# Patient Record
Sex: FEMALE | Race: WHITE | Employment: OTHER | URBAN - METROPOLITAN AREA
[De-identification: names, ages, dates, MRNs, and addresses within clinical notes are randomized per-mention and may not be internally consistent; named-entity substitution may affect disease eponyms.]

---

## 2018-04-12 ENCOUNTER — OFFICE VISIT (OUTPATIENT)
Dept: ORTHOPEDIC SURGERY | Age: 83
End: 2018-04-12

## 2018-04-12 VITALS — WEIGHT: 110 LBS | BODY MASS INDEX: 17.68 KG/M2 | HEIGHT: 66 IN

## 2018-04-12 DIAGNOSIS — S62.647A CLOSED NONDISPLACED FRACTURE OF PROXIMAL PHALANX OF LEFT LITTLE FINGER, INITIAL ENCOUNTER: Primary | ICD-10-CM

## 2018-04-12 PROCEDURE — 99203 OFFICE O/P NEW LOW 30 MIN: CPT | Performed by: ORTHOPAEDIC SURGERY

## 2018-04-12 PROCEDURE — 1123F ACP DISCUSS/DSCN MKR DOCD: CPT | Performed by: ORTHOPAEDIC SURGERY

## 2018-04-12 PROCEDURE — 1036F TOBACCO NON-USER: CPT | Performed by: ORTHOPAEDIC SURGERY

## 2018-04-12 PROCEDURE — G8400 PT W/DXA NO RESULTS DOC: HCPCS | Performed by: ORTHOPAEDIC SURGERY

## 2018-04-12 PROCEDURE — 1090F PRES/ABSN URINE INCON ASSESS: CPT | Performed by: ORTHOPAEDIC SURGERY

## 2018-04-12 PROCEDURE — 4040F PNEUMOC VAC/ADMIN/RCVD: CPT | Performed by: ORTHOPAEDIC SURGERY

## 2018-04-12 PROCEDURE — G8427 DOCREV CUR MEDS BY ELIG CLIN: HCPCS | Performed by: ORTHOPAEDIC SURGERY

## 2018-04-12 PROCEDURE — G8419 CALC BMI OUT NRM PARAM NOF/U: HCPCS | Performed by: ORTHOPAEDIC SURGERY

## 2018-04-20 ENCOUNTER — TELEPHONE (OUTPATIENT)
Dept: ORTHOPEDIC SURGERY | Age: 83
End: 2018-04-20

## 2020-09-12 ENCOUNTER — APPOINTMENT (OUTPATIENT)
Dept: GENERAL RADIOLOGY | Age: 85
DRG: 470 | End: 2020-09-12
Payer: MEDICARE

## 2020-09-12 ENCOUNTER — HOSPITAL ENCOUNTER (INPATIENT)
Age: 85
LOS: 3 days | Discharge: NURSING, REHAB OR LONG-TERM CARE | DRG: 470 | End: 2020-09-15
Attending: EMERGENCY MEDICINE | Admitting: INTERNAL MEDICINE
Payer: MEDICARE

## 2020-09-12 ENCOUNTER — APPOINTMENT (OUTPATIENT)
Dept: CT IMAGING | Age: 85
DRG: 470 | End: 2020-09-12
Payer: MEDICARE

## 2020-09-12 PROBLEM — S72.8X2A OTHER FRACTURE OF LEFT FEMUR, INITIAL ENCOUNTER FOR CLOSED FRACTURE (HCC): Status: ACTIVE | Noted: 2020-09-12

## 2020-09-12 LAB
ABO/RH: NORMAL
ANION GAP SERPL CALCULATED.3IONS-SCNC: 12 MMOL/L (ref 3–16)
ANTIBODY SCREEN: NORMAL
APTT: 30 SEC (ref 24.2–36.2)
BACTERIA: ABNORMAL /HPF
BASOPHILS ABSOLUTE: 0.1 K/UL (ref 0–0.2)
BASOPHILS RELATIVE PERCENT: 0.6 %
BILIRUBIN URINE: NEGATIVE
BLOOD, URINE: NEGATIVE
BUN BLDV-MCNC: 15 MG/DL (ref 7–20)
CALCIUM SERPL-MCNC: 8.8 MG/DL (ref 8.3–10.6)
CHLORIDE BLD-SCNC: 103 MMOL/L (ref 99–110)
CLARITY: ABNORMAL
CO2: 24 MMOL/L (ref 21–32)
COLOR: YELLOW
CREAT SERPL-MCNC: 1.1 MG/DL (ref 0.6–1.2)
EOSINOPHILS ABSOLUTE: 0 K/UL (ref 0–0.6)
EOSINOPHILS RELATIVE PERCENT: 0.3 %
GFR AFRICAN AMERICAN: 57
GFR NON-AFRICAN AMERICAN: 47
GLUCOSE BLD-MCNC: 124 MG/DL (ref 70–99)
GLUCOSE URINE: NEGATIVE MG/DL
HCT VFR BLD CALC: 38.2 % (ref 36–48)
HEMOGLOBIN: 12.4 G/DL (ref 12–16)
INR BLD: 1.03 (ref 0.86–1.14)
KETONES, URINE: ABNORMAL MG/DL
LEUKOCYTE ESTERASE, URINE: NEGATIVE
LYMPHOCYTES ABSOLUTE: 1 K/UL (ref 1–5.1)
LYMPHOCYTES RELATIVE PERCENT: 7.8 %
MCH RBC QN AUTO: 28.6 PG (ref 26–34)
MCHC RBC AUTO-ENTMCNC: 32.6 G/DL (ref 31–36)
MCV RBC AUTO: 87.8 FL (ref 80–100)
MICROSCOPIC EXAMINATION: YES
MONOCYTES ABSOLUTE: 0.8 K/UL (ref 0–1.3)
MONOCYTES RELATIVE PERCENT: 6.6 %
NEUTROPHILS ABSOLUTE: 10.8 K/UL (ref 1.7–7.7)
NEUTROPHILS RELATIVE PERCENT: 84.7 %
NITRITE, URINE: POSITIVE
PDW BLD-RTO: 14.2 % (ref 12.4–15.4)
PH UA: 8 (ref 5–8)
PLATELET # BLD: 446 K/UL (ref 135–450)
PMV BLD AUTO: 7.4 FL (ref 5–10.5)
POTASSIUM REFLEX MAGNESIUM: 4 MMOL/L (ref 3.5–5.1)
PROTEIN UA: NEGATIVE MG/DL
PROTHROMBIN TIME: 11.9 SEC (ref 10–13.2)
RBC # BLD: 4.34 M/UL (ref 4–5.2)
RBC UA: ABNORMAL /HPF (ref 0–4)
SARS-COV-2, NAAT: NOT DETECTED
SODIUM BLD-SCNC: 139 MMOL/L (ref 136–145)
SPECIFIC GRAVITY UA: 1.02 (ref 1–1.03)
URINE TYPE: ABNORMAL
UROBILINOGEN, URINE: 1 E.U./DL
WBC # BLD: 12.8 K/UL (ref 4–11)
WBC UA: ABNORMAL /HPF (ref 0–5)

## 2020-09-12 PROCEDURE — 85610 PROTHROMBIN TIME: CPT

## 2020-09-12 PROCEDURE — U0003 INFECTIOUS AGENT DETECTION BY NUCLEIC ACID (DNA OR RNA); SEVERE ACUTE RESPIRATORY SYNDROME CORONAVIRUS 2 (SARS-COV-2) (CORONAVIRUS DISEASE [COVID-19]), AMPLIFIED PROBE TECHNIQUE, MAKING USE OF HIGH THROUGHPUT TECHNOLOGIES AS DESCRIBED BY CMS-2020-01-R: HCPCS

## 2020-09-12 PROCEDURE — 71045 X-RAY EXAM CHEST 1 VIEW: CPT

## 2020-09-12 PROCEDURE — 6360000002 HC RX W HCPCS: Performed by: STUDENT IN AN ORGANIZED HEALTH CARE EDUCATION/TRAINING PROGRAM

## 2020-09-12 PROCEDURE — 99285 EMERGENCY DEPT VISIT HI MDM: CPT

## 2020-09-12 PROCEDURE — 2580000003 HC RX 258: Performed by: STUDENT IN AN ORGANIZED HEALTH CARE EDUCATION/TRAINING PROGRAM

## 2020-09-12 PROCEDURE — 72170 X-RAY EXAM OF PELVIS: CPT

## 2020-09-12 PROCEDURE — 81001 URINALYSIS AUTO W/SCOPE: CPT

## 2020-09-12 PROCEDURE — 73560 X-RAY EXAM OF KNEE 1 OR 2: CPT

## 2020-09-12 PROCEDURE — U0002 COVID-19 LAB TEST NON-CDC: HCPCS

## 2020-09-12 PROCEDURE — 80048 BASIC METABOLIC PNL TOTAL CA: CPT

## 2020-09-12 PROCEDURE — 70450 CT HEAD/BRAIN W/O DYE: CPT

## 2020-09-12 PROCEDURE — 96365 THER/PROPH/DIAG IV INF INIT: CPT

## 2020-09-12 PROCEDURE — 86900 BLOOD TYPING SEROLOGIC ABO: CPT

## 2020-09-12 PROCEDURE — 36415 COLL VENOUS BLD VENIPUNCTURE: CPT

## 2020-09-12 PROCEDURE — 86901 BLOOD TYPING SEROLOGIC RH(D): CPT

## 2020-09-12 PROCEDURE — 85025 COMPLETE CBC W/AUTO DIFF WBC: CPT

## 2020-09-12 PROCEDURE — 86850 RBC ANTIBODY SCREEN: CPT

## 2020-09-12 PROCEDURE — 1200000000 HC SEMI PRIVATE

## 2020-09-12 PROCEDURE — 85730 THROMBOPLASTIN TIME PARTIAL: CPT

## 2020-09-12 RX ORDER — PROMETHAZINE HYDROCHLORIDE 25 MG/1
12.5 TABLET ORAL EVERY 6 HOURS PRN
Status: DISCONTINUED | OUTPATIENT
Start: 2020-09-12 | End: 2020-09-15 | Stop reason: HOSPADM

## 2020-09-12 RX ORDER — LEVOTHYROXINE SODIUM 0.07 MG/1
75 TABLET ORAL DAILY
Status: DISCONTINUED | OUTPATIENT
Start: 2020-09-13 | End: 2020-09-15 | Stop reason: HOSPADM

## 2020-09-12 RX ORDER — PSYLLIUM HUSK 0.4 G
1000 CAPSULE ORAL DAILY
COMMUNITY

## 2020-09-12 RX ORDER — SODIUM CHLORIDE 0.9 % (FLUSH) 0.9 %
10 SYRINGE (ML) INJECTION PRN
Status: DISCONTINUED | OUTPATIENT
Start: 2020-09-12 | End: 2020-09-15 | Stop reason: HOSPADM

## 2020-09-12 RX ORDER — POLYETHYLENE GLYCOL 3350 17 G/17G
17 POWDER, FOR SOLUTION ORAL DAILY PRN
Status: DISCONTINUED | OUTPATIENT
Start: 2020-09-12 | End: 2020-09-15 | Stop reason: HOSPADM

## 2020-09-12 RX ORDER — MORPHINE SULFATE 2 MG/ML
2 INJECTION, SOLUTION INTRAMUSCULAR; INTRAVENOUS
Status: DISCONTINUED | OUTPATIENT
Start: 2020-09-12 | End: 2020-09-15 | Stop reason: HOSPADM

## 2020-09-12 RX ORDER — MEMANTINE HYDROCHLORIDE 5 MG/1
10 TABLET ORAL 2 TIMES DAILY
Status: DISCONTINUED | OUTPATIENT
Start: 2020-09-13 | End: 2020-09-15 | Stop reason: HOSPADM

## 2020-09-12 RX ORDER — LANOLIN ALCOHOL/MO/W.PET/CERES
3 CREAM (GRAM) TOPICAL NIGHTLY PRN
Status: DISCONTINUED | OUTPATIENT
Start: 2020-09-12 | End: 2020-09-15 | Stop reason: HOSPADM

## 2020-09-12 RX ORDER — DONEPEZIL HYDROCHLORIDE 10 MG/1
10 TABLET, FILM COATED ORAL NIGHTLY
Status: DISCONTINUED | OUTPATIENT
Start: 2020-09-13 | End: 2020-09-15 | Stop reason: HOSPADM

## 2020-09-12 RX ORDER — ACETAMINOPHEN 160 MG/5ML
650 SOLUTION ORAL EVERY 6 HOURS
Status: DISCONTINUED | OUTPATIENT
Start: 2020-09-12 | End: 2020-09-15 | Stop reason: HOSPADM

## 2020-09-12 RX ORDER — ONDANSETRON 2 MG/ML
4 INJECTION INTRAMUSCULAR; INTRAVENOUS EVERY 6 HOURS PRN
Status: DISCONTINUED | OUTPATIENT
Start: 2020-09-12 | End: 2020-09-15 | Stop reason: HOSPADM

## 2020-09-12 RX ORDER — SODIUM CHLORIDE 0.9 % (FLUSH) 0.9 %
10 SYRINGE (ML) INJECTION EVERY 12 HOURS SCHEDULED
Status: DISCONTINUED | OUTPATIENT
Start: 2020-09-12 | End: 2020-09-15 | Stop reason: HOSPADM

## 2020-09-12 RX ORDER — CLINDAMYCIN PHOSPHATE 900 MG/50ML
900 INJECTION INTRAVENOUS
Status: COMPLETED | OUTPATIENT
Start: 2020-09-13 | End: 2020-09-13

## 2020-09-12 RX ORDER — ESCITALOPRAM OXALATE 5 MG/1
5 TABLET ORAL DAILY
Status: DISCONTINUED | OUTPATIENT
Start: 2020-09-13 | End: 2020-09-15 | Stop reason: HOSPADM

## 2020-09-12 RX ORDER — TRAMADOL HYDROCHLORIDE 50 MG/1
25 TABLET ORAL EVERY 6 HOURS PRN
Status: DISCONTINUED | OUTPATIENT
Start: 2020-09-12 | End: 2020-09-15 | Stop reason: HOSPADM

## 2020-09-12 RX ADMIN — CEFTRIAXONE 1 G: 1 INJECTION, POWDER, FOR SOLUTION INTRAMUSCULAR; INTRAVENOUS at 22:07

## 2020-09-12 ASSESSMENT — ENCOUNTER SYMPTOMS
SORE THROAT: 0
WHEEZING: 0
NAUSEA: 0
EYE PAIN: 0
PHOTOPHOBIA: 0
ABDOMINAL PAIN: 0
VOMITING: 0
SHORTNESS OF BREATH: 0

## 2020-09-12 ASSESSMENT — PAIN SCALES - PAIN ASSESSMENT IN ADVANCED DEMENTIA (PAINAD)
CONSOLABILITY: 1
BODYLANGUAGE: 0
BREATHING: 1
TOTALSCORE: 3
NEGVOCALIZATION: 1
FACIALEXPRESSION: 0

## 2020-09-12 ASSESSMENT — PAIN SCALES - WONG BAKER: WONGBAKER_NUMERICALRESPONSE: 6

## 2020-09-12 NOTE — ED PROVIDER NOTES
ED Attending Attestation Note     Date of evaluation: 9/12/2020    This patient was seen by the resident. I have seen and examined the patient, agree with the workup, evaluation, management and diagnosis. The care plan has been discussed. I have reviewed the ECG and concur with the resident's interpretation. My assessment reveals patient with history of dementia presents after witnessed fall with findings of a hip fracture. Patient's left leg is foreshortened and externally rotated.      Gordy Cosby MD  09/12/20 7814

## 2020-09-12 NOTE — ED PROVIDER NOTES
[address] ENCOUNTER          EM RESIDENT NOTE       Date of evaluation: 9/12/2020    Chief Complaint     Hip Injury (left hip fracture per [facility] from fall this AM)      History of Present Illness     Nilay Daniels is a 80 y.o. female who presents with a chief complaint of Hip Injury (left hip fracture per E[facility] from fall this AM)    Past medical history notable for: dementia, hypertension, hypothyroidism. Previous fall in 2018 at which time the patient had a humeral fracture    The patient presents from nursing home. Per EMS report, the patient had a witnessed non-syncopal fall today approximately 12 hours prior to presentation. There was pain in her left hip. Per the report, the nursing home obtained x-rays of the left hip and there was a fracture. The patient has received Tylenol for pain control. On arrival, the patient is conversant. She states that she has pain in her left hip. She states that she did not hit her head. She did not lose consciousness. XR read from facility indicates left femoral neck fracture. Not on anticoagulation. Dr. Merly Gooden was called as he is a friend of the family. He is aware of the patient. He was spoken to on the phone. He states that if there are no other findings, he would like the patient admitted to the hospital and will see her in the morning. Other than stated above, no additional aggravating or alleviating factors are noted. All nursing notes and triage notes were appropriately reviewed in the course of the creation of this note. Past Medical, Surgical, Family, and Social History     She has a past medical history of Anemia, Dementia ([address] Hypertension, and Thyroid disease. She has a past surgical history that includes Hysterectomy and Eye surgery. Her family history is not on file. She reports that she has never smoked. She has never used smokeless tobacco. She reports current alcohol use.  She reports that she does not use drugs. Review of Systems     Review of Systems   Constitutional: Negative for chills and fever. HENT: Negative for ear pain and sore throat. Eyes: Negative for photophobia and pain. Respiratory: Negative for shortness of breath and wheezing. Cardiovascular: Negative for chest pain. Gastrointestinal: Negative for abdominal pain, nausea and vomiting. Genitourinary: Negative for difficulty urinating, dysuria, flank pain, hematuria and menstrual problem. Musculoskeletal: Positive for gait problem. Left hip pain   Skin: Negative for rash. Neurological: Negative for syncope and weakness. Psychiatric/Behavioral: Negative. Medications     Previous Medications    ACETAMINOPHEN (TYLENOL) 325 MG TABLET    Take 650 mg by mouth 3 times daily as needed for Pain    ASPIRIN 325 MG EC TABLET    Take 325 mg by mouth daily. BISACODYL (DULCOLAX) 10 MG SUPPOSITORY    Place 10 mg rectally daily    DONEPEZIL (ARICEPT) 5 MG TABLET    Take 10 mg by mouth nightly     ESCITALOPRAM (LEXAPRO) 5 MG TABLET    Take 5 mg by mouth daily. GUAIFENESIN-DEXTROMETHORPHAN (ROBITUSSIN DM) 100-10 MG/5ML SYRUP    Take 10 mLs by mouth 4 times daily as needed for Cough    LEVOTHYROXINE (SYNTHROID) 75 MCG TABLET    Take 75 mcg by mouth daily     LORATADINE (CLARITIN) 10 MG CAPSULE    Take 10 mg by mouth daily    MAGNESIUM HYDROXIDE (MILK OF MAGNESIA) 400 MG/5ML SUSPENSION    Take 30 mLs by mouth 3 times daily as needed for Constipation    MELATONIN 3 MG TABS TABLET    Take 6 mg by mouth nightly as needed    MEMANTINE ER (NAMENDA XR) 28 MG CP24 EXTENDED RELEASE CAPSULE    Take by mouth daily    VITAMIN D (ERGOCALCIFEROL) 60231 UNITS CAPS CAPSULE    Take 50,000 Units by mouth daily        Allergies     She is allergic to pcn [penicillins] and sulfa antibiotics.     Physical Exam     INITIAL VITALS:   BP: (!) 156/59, Temp: 97.5 °F (36.4 °C), Pulse: 85, Resp: 14, SpO2: 92 %     Physical Exam  Vitals signs and nursing note reviewed. Constitutional:       General: She is not in acute distress. HENT:      Head: Normocephalic and atraumatic. Right Ear: External ear normal.      Left Ear: External ear normal.      Nose: Nose normal.      Mouth/Throat:      Mouth: Mucous membranes are moist.   Eyes:      Extraocular Movements: Extraocular movements intact. Pupils: Pupils are equal, round, and reactive to light. Neck:      Musculoskeletal: Normal range of motion and neck supple. No neck rigidity. Cardiovascular:      Rate and Rhythm: Regular rhythm. Heart sounds: Normal heart sounds. No murmur. No friction rub. No gallop. Pulmonary:      Effort: Pulmonary effort is normal. No respiratory distress. Breath sounds: Normal breath sounds. No wheezing or rhonchi. Abdominal:      Palpations: Abdomen is soft. Tenderness: There is no abdominal tenderness. There is no right CVA tenderness, left CVA tenderness, guarding or rebound. Musculoskeletal:         General: Tenderness and signs of injury present. No deformity. Comments: The patient has appropriate strength and range of motion in her right lower extremity. The left lower extremity the patient is unable to lift off the bed due to pain. The LLE is shortened and externally rotated compared to the right. She is additionally unable to flex her left knee due to pain. Intact to sensation distally. Skin:     General: Skin is warm and dry. Findings: No rash. Comments: There is a small skin tear over the left elbow. There is an area of ecchymosis over the left anterior knee. Neurological:      Mental Status: She is alert. Mental status is at baseline. She is confused. Cranial Nerves: No cranial nerve deficit. Sensory: No sensory deficit.    Psychiatric:         Mood and Affect: Mood normal.         Behavior: Behavior normal.          Diagnostic Results     LABS AND RADIOLOGY  Please see electronic medical record for any tests [AF]   2034 No acute fracture or dislocation. Severe degenerative narrowing of medial femoral tibial compartment. No significant joint effusion. XR KNEE LEFT (1-2 VIEWS) [AF]   2035 Heart is upper normal limits in size. No focal consolidation. No pleural effusion. No pneumothorax. Previous ORIF of the right humerus. XR CHEST PORTABLE [AF]   2047 Suspect likely leukocytosis from stress response to injury. No concern for infection   WBC(!): 12.8 [AF]   2122 stable   Creatinine: 1.1 [AF]      ED Course User Index  [AF] Kevin Chavez MD     No evidence of hemorrhage, fracture on CT head. Radiography demonstrates left femoral neck fracture. No evidence of fracture of the left knee. We spoke with the patient's orthopedic surgeon who would like the patient admitted and plans to see her tomorrow. Preop labs obtained. The patient will be admitted. I spoke on the phone with hospitalist who has agreed to admit the patient. Incidental finding of urine with 4+ bacteriuria as well as nitrites. Patient is asymptomatic at this time but this will be treated as the patient will be going to the OR tomorrow. Plan: At this time, the patient has been admitted to medicine for further evaluation and management of hip fracture by ortho. The patient will continue to be monitored here in the emergency department until which time she is moved to her new treatment location. Workup, treatment, and diagnosis were discussed with the patient and/or family members; the patient agrees to the plan and all questions were addressed and answered. Krystyna Regan MD, PGY-1   Emergency Medicine    Clinical Impression     1. Cl[facility] of [facility] of left femur[facility]        Disposition     PATIENT REFERRED TO:  No follow-up provider specified.     DISCHARGE MEDICATIONS:  New Prescriptions    No medications on file       Jayesh Tse MD  Resident  09/12/20 7268

## 2020-09-13 ENCOUNTER — ANESTHESIA EVENT (OUTPATIENT)
Dept: OPERATING ROOM | Age: 85
DRG: 470 | End: 2020-09-13
Payer: MEDICARE

## 2020-09-13 ENCOUNTER — ANESTHESIA (OUTPATIENT)
Dept: OPERATING ROOM | Age: 85
DRG: 470 | End: 2020-09-13
Payer: MEDICARE

## 2020-09-13 ENCOUNTER — APPOINTMENT (OUTPATIENT)
Dept: GENERAL RADIOLOGY | Age: 85
DRG: 470 | End: 2020-09-13
Payer: MEDICARE

## 2020-09-13 VITALS — OXYGEN SATURATION: 100 % | SYSTOLIC BLOOD PRESSURE: 96 MMHG | DIASTOLIC BLOOD PRESSURE: 48 MMHG | TEMPERATURE: 94.6 F

## 2020-09-13 PROCEDURE — 6360000002 HC RX W HCPCS: Performed by: ORTHOPAEDIC SURGERY

## 2020-09-13 PROCEDURE — 99222 1ST HOSP IP/OBS MODERATE 55: CPT | Performed by: ORTHOPAEDIC SURGERY

## 2020-09-13 PROCEDURE — 6360000002 HC RX W HCPCS: Performed by: STUDENT IN AN ORGANIZED HEALTH CARE EDUCATION/TRAINING PROGRAM

## 2020-09-13 PROCEDURE — 6370000000 HC RX 637 (ALT 250 FOR IP): Performed by: PEDIATRICS

## 2020-09-13 PROCEDURE — 73502 X-RAY EXAM HIP UNI 2-3 VIEWS: CPT

## 2020-09-13 PROCEDURE — 2580000003 HC RX 258: Performed by: STUDENT IN AN ORGANIZED HEALTH CARE EDUCATION/TRAINING PROGRAM

## 2020-09-13 PROCEDURE — 27236 TREAT THIGH FRACTURE: CPT | Performed by: ORTHOPAEDIC SURGERY

## 2020-09-13 PROCEDURE — 2500000003 HC RX 250 WO HCPCS: Performed by: ORTHOPAEDIC SURGERY

## 2020-09-13 PROCEDURE — 0SRS0JA REPLACEMENT OF LEFT HIP JOINT, FEMORAL SURFACE WITH SYNTHETIC SUBSTITUTE, UNCEMENTED, OPEN APPROACH: ICD-10-PCS | Performed by: ORTHOPAEDIC SURGERY

## 2020-09-13 PROCEDURE — 2580000003 HC RX 258: Performed by: ANESTHESIOLOGY

## 2020-09-13 PROCEDURE — 2580000003 HC RX 258: Performed by: ORTHOPAEDIC SURGERY

## 2020-09-13 PROCEDURE — 2500000003 HC RX 250 WO HCPCS: Performed by: ANESTHESIOLOGY

## 2020-09-13 PROCEDURE — 3600000014 HC SURGERY LEVEL 4 ADDTL 15MIN: Performed by: ORTHOPAEDIC SURGERY

## 2020-09-13 PROCEDURE — 3700000000 HC ANESTHESIA ATTENDED CARE: Performed by: ORTHOPAEDIC SURGERY

## 2020-09-13 PROCEDURE — 6370000000 HC RX 637 (ALT 250 FOR IP): Performed by: ORTHOPAEDIC SURGERY

## 2020-09-13 PROCEDURE — 6360000002 HC RX W HCPCS: Performed by: PEDIATRICS

## 2020-09-13 PROCEDURE — 1200000000 HC SEMI PRIVATE

## 2020-09-13 PROCEDURE — 94150 VITAL CAPACITY TEST: CPT

## 2020-09-13 PROCEDURE — 2709999900 HC NON-CHARGEABLE SUPPLY: Performed by: ORTHOPAEDIC SURGERY

## 2020-09-13 PROCEDURE — 88311 DECALCIFY TISSUE: CPT

## 2020-09-13 PROCEDURE — 88305 TISSUE EXAM BY PATHOLOGIST: CPT

## 2020-09-13 PROCEDURE — 7100000001 HC PACU RECOVERY - ADDTL 15 MIN: Performed by: ORTHOPAEDIC SURGERY

## 2020-09-13 PROCEDURE — C1776 JOINT DEVICE (IMPLANTABLE): HCPCS | Performed by: ORTHOPAEDIC SURGERY

## 2020-09-13 PROCEDURE — 7100000000 HC PACU RECOVERY - FIRST 15 MIN: Performed by: ORTHOPAEDIC SURGERY

## 2020-09-13 PROCEDURE — 3700000001 HC ADD 15 MINUTES (ANESTHESIA): Performed by: ORTHOPAEDIC SURGERY

## 2020-09-13 PROCEDURE — 3600000004 HC SURGERY LEVEL 4 BASE: Performed by: ORTHOPAEDIC SURGERY

## 2020-09-13 PROCEDURE — 2580000003 HC RX 258: Performed by: PEDIATRICS

## 2020-09-13 PROCEDURE — 6360000002 HC RX W HCPCS: Performed by: ANESTHESIOLOGY

## 2020-09-13 DEVICE — STEM FEM SZ 5 L108MM NK L35MM 44MM OFFSET 127DEG HIP TI: Type: IMPLANTABLE DEVICE | Site: HIP | Status: FUNCTIONAL

## 2020-09-13 DEVICE — IMPL HIP FEM HEAD TAPR VITALLIUM 4MM: Type: IMPLANTABLE DEVICE | Site: HIP | Status: FUNCTIONAL

## 2020-09-13 DEVICE — COMPONENT TOT HIP CAPPED BPLR UPLR CEM STEM H4STRYKER] STRYKER CORP]: Type: IMPLANTABLE DEVICE | Site: HIP | Status: FUNCTIONAL

## 2020-09-13 RX ORDER — SODIUM CHLORIDE 450 MG/100ML
INJECTION, SOLUTION INTRAVENOUS CONTINUOUS
Status: DISCONTINUED | OUTPATIENT
Start: 2020-09-13 | End: 2020-09-15 | Stop reason: HOSPADM

## 2020-09-13 RX ORDER — BUPIVACAINE HYDROCHLORIDE 5 MG/ML
INJECTION, SOLUTION EPIDURAL; INTRACAUDAL PRN
Status: DISCONTINUED | OUTPATIENT
Start: 2020-09-13 | End: 2020-09-13 | Stop reason: ALTCHOICE

## 2020-09-13 RX ORDER — PROMETHAZINE HYDROCHLORIDE 25 MG/ML
6.25 INJECTION, SOLUTION INTRAMUSCULAR; INTRAVENOUS
Status: DISCONTINUED | OUTPATIENT
Start: 2020-09-13 | End: 2020-09-13 | Stop reason: HOSPADM

## 2020-09-13 RX ORDER — GLYCOPYRROLATE 1 MG/5 ML
SYRINGE (ML) INTRAVENOUS PRN
Status: DISCONTINUED | OUTPATIENT
Start: 2020-09-13 | End: 2020-09-13 | Stop reason: SDUPTHER

## 2020-09-13 RX ORDER — ROCURONIUM BROMIDE 10 MG/ML
INJECTION, SOLUTION INTRAVENOUS PRN
Status: DISCONTINUED | OUTPATIENT
Start: 2020-09-13 | End: 2020-09-13 | Stop reason: SDUPTHER

## 2020-09-13 RX ORDER — HYDRALAZINE HYDROCHLORIDE 20 MG/ML
5 INJECTION INTRAMUSCULAR; INTRAVENOUS EVERY 10 MIN PRN
Status: DISCONTINUED | OUTPATIENT
Start: 2020-09-13 | End: 2020-09-13 | Stop reason: HOSPADM

## 2020-09-13 RX ORDER — MAGNESIUM HYDROXIDE 1200 MG/15ML
LIQUID ORAL CONTINUOUS PRN
Status: COMPLETED | OUTPATIENT
Start: 2020-09-13 | End: 2020-09-13

## 2020-09-13 RX ORDER — SODIUM CHLORIDE 0.9 % (FLUSH) 0.9 %
10 SYRINGE (ML) INJECTION EVERY 12 HOURS SCHEDULED
Status: DISCONTINUED | OUTPATIENT
Start: 2020-09-13 | End: 2020-09-15 | Stop reason: HOSPADM

## 2020-09-13 RX ORDER — ONDANSETRON 2 MG/ML
4 INJECTION INTRAMUSCULAR; INTRAVENOUS
Status: DISCONTINUED | OUTPATIENT
Start: 2020-09-13 | End: 2020-09-13 | Stop reason: HOSPADM

## 2020-09-13 RX ORDER — ONDANSETRON 2 MG/ML
INJECTION INTRAMUSCULAR; INTRAVENOUS PRN
Status: DISCONTINUED | OUTPATIENT
Start: 2020-09-13 | End: 2020-09-13 | Stop reason: SDUPTHER

## 2020-09-13 RX ORDER — LABETALOL 20 MG/4 ML (5 MG/ML) INTRAVENOUS SYRINGE
5 EVERY 10 MIN PRN
Status: DISCONTINUED | OUTPATIENT
Start: 2020-09-13 | End: 2020-09-13 | Stop reason: HOSPADM

## 2020-09-13 RX ORDER — FENTANYL CITRATE 50 UG/ML
INJECTION, SOLUTION INTRAMUSCULAR; INTRAVENOUS PRN
Status: DISCONTINUED | OUTPATIENT
Start: 2020-09-13 | End: 2020-09-13 | Stop reason: SDUPTHER

## 2020-09-13 RX ORDER — DEXAMETHASONE SODIUM PHOSPHATE 4 MG/ML
INJECTION, SOLUTION INTRA-ARTICULAR; INTRALESIONAL; INTRAMUSCULAR; INTRAVENOUS; SOFT TISSUE PRN
Status: DISCONTINUED | OUTPATIENT
Start: 2020-09-13 | End: 2020-09-13 | Stop reason: SDUPTHER

## 2020-09-13 RX ORDER — SODIUM CHLORIDE, SODIUM LACTATE, POTASSIUM CHLORIDE, CALCIUM CHLORIDE 600; 310; 30; 20 MG/100ML; MG/100ML; MG/100ML; MG/100ML
INJECTION, SOLUTION INTRAVENOUS CONTINUOUS PRN
Status: DISCONTINUED | OUTPATIENT
Start: 2020-09-13 | End: 2020-09-13 | Stop reason: SDUPTHER

## 2020-09-13 RX ORDER — FENTANYL CITRATE 50 UG/ML
25 INJECTION, SOLUTION INTRAMUSCULAR; INTRAVENOUS EVERY 5 MIN PRN
Status: DISCONTINUED | OUTPATIENT
Start: 2020-09-13 | End: 2020-09-13 | Stop reason: HOSPADM

## 2020-09-13 RX ORDER — PROPOFOL 10 MG/ML
INJECTION, EMULSION INTRAVENOUS PRN
Status: DISCONTINUED | OUTPATIENT
Start: 2020-09-13 | End: 2020-09-13 | Stop reason: SDUPTHER

## 2020-09-13 RX ORDER — EPHEDRINE SULFATE 50 MG/ML
INJECTION, SOLUTION INTRAVENOUS PRN
Status: DISCONTINUED | OUTPATIENT
Start: 2020-09-13 | End: 2020-09-13 | Stop reason: SDUPTHER

## 2020-09-13 RX ORDER — SENNA AND DOCUSATE SODIUM 50; 8.6 MG/1; MG/1
1 TABLET, FILM COATED ORAL 2 TIMES DAILY
Status: DISCONTINUED | OUTPATIENT
Start: 2020-09-13 | End: 2020-09-15 | Stop reason: HOSPADM

## 2020-09-13 RX ORDER — FENTANYL CITRATE 50 UG/ML
50 INJECTION, SOLUTION INTRAMUSCULAR; INTRAVENOUS EVERY 5 MIN PRN
Status: DISCONTINUED | OUTPATIENT
Start: 2020-09-13 | End: 2020-09-13 | Stop reason: HOSPADM

## 2020-09-13 RX ORDER — SODIUM CHLORIDE 9 MG/ML
INJECTION, SOLUTION INTRAVENOUS CONTINUOUS PRN
Status: DISCONTINUED | OUTPATIENT
Start: 2020-09-13 | End: 2020-09-13 | Stop reason: SDUPTHER

## 2020-09-13 RX ORDER — CLINDAMYCIN PHOSPHATE 900 MG/50ML
900 INJECTION INTRAVENOUS EVERY 8 HOURS
Status: COMPLETED | OUTPATIENT
Start: 2020-09-13 | End: 2020-09-14

## 2020-09-13 RX ORDER — OXYCODONE HYDROCHLORIDE AND ACETAMINOPHEN 5; 325 MG/1; MG/1
1 TABLET ORAL
Status: DISCONTINUED | OUTPATIENT
Start: 2020-09-13 | End: 2020-09-13 | Stop reason: HOSPADM

## 2020-09-13 RX ORDER — NEOSTIGMINE METHYLSULFATE 5 MG/5 ML
SYRINGE (ML) INTRAVENOUS PRN
Status: DISCONTINUED | OUTPATIENT
Start: 2020-09-13 | End: 2020-09-13 | Stop reason: SDUPTHER

## 2020-09-13 RX ORDER — SODIUM CHLORIDE 0.9 % (FLUSH) 0.9 %
10 SYRINGE (ML) INJECTION PRN
Status: DISCONTINUED | OUTPATIENT
Start: 2020-09-13 | End: 2020-09-15 | Stop reason: HOSPADM

## 2020-09-13 RX ADMIN — EPHEDRINE SULFATE 5 MG: 50 INJECTION, SOLUTION INTRAMUSCULAR; INTRAVENOUS; SUBCUTANEOUS at 10:08

## 2020-09-13 RX ADMIN — SODIUM CHLORIDE, SODIUM LACTATE, POTASSIUM CHLORIDE, AND CALCIUM CHLORIDE: 600; 310; 30; 20 INJECTION, SOLUTION INTRAVENOUS at 09:04

## 2020-09-13 RX ADMIN — ACETAMINOPHEN 650 MG: 650 SOLUTION ORAL at 23:53

## 2020-09-13 RX ADMIN — CEFAZOLIN SODIUM 1000 MG: 1 INJECTION, POWDER, FOR SOLUTION INTRAMUSCULAR; INTRAVENOUS at 14:47

## 2020-09-13 RX ADMIN — PROPOFOL 50 MG: 10 INJECTION, EMULSION INTRAVENOUS at 08:34

## 2020-09-13 RX ADMIN — CEFTRIAXONE 1 G: 1 INJECTION, POWDER, FOR SOLUTION INTRAMUSCULAR; INTRAVENOUS at 14:47

## 2020-09-13 RX ADMIN — MEMANTINE HYDROCHLORIDE 10 MG: 5 TABLET ORAL at 21:27

## 2020-09-13 RX ADMIN — ACETAMINOPHEN 650 MG: 650 SOLUTION ORAL at 13:05

## 2020-09-13 RX ADMIN — ESCITALOPRAM 5 MG: 5 TABLET, FILM COATED ORAL at 13:05

## 2020-09-13 RX ADMIN — EPHEDRINE SULFATE 5 MG: 50 INJECTION, SOLUTION INTRAMUSCULAR; INTRAVENOUS; SUBCUTANEOUS at 09:42

## 2020-09-13 RX ADMIN — ONDANSETRON 4 MG: 2 INJECTION INTRAMUSCULAR; INTRAVENOUS at 09:07

## 2020-09-13 RX ADMIN — ACETAMINOPHEN 650 MG: 650 SOLUTION ORAL at 00:09

## 2020-09-13 RX ADMIN — CLINDAMYCIN IN 5 PERCENT DEXTROSE 900 MG: 18 INJECTION, SOLUTION INTRAVENOUS at 08:46

## 2020-09-13 RX ADMIN — CEFTRIAXONE 1 G: 1 INJECTION, POWDER, FOR SOLUTION INTRAMUSCULAR; INTRAVENOUS at 08:52

## 2020-09-13 RX ADMIN — CEFTRIAXONE 1 G: 1 INJECTION, POWDER, FOR SOLUTION INTRAMUSCULAR; INTRAVENOUS at 22:00

## 2020-09-13 RX ADMIN — FENTANYL CITRATE 100 MCG: 50 INJECTION INTRAMUSCULAR; INTRAVENOUS at 08:34

## 2020-09-13 RX ADMIN — Medication 3 MG: at 00:08

## 2020-09-13 RX ADMIN — CLINDAMYCIN IN 5 PERCENT DEXTROSE 900 MG: 18 INJECTION, SOLUTION INTRAVENOUS at 18:26

## 2020-09-13 RX ADMIN — FAMOTIDINE 20 MG: 10 INJECTION, SOLUTION INTRAVENOUS at 09:07

## 2020-09-13 RX ADMIN — MEMANTINE HYDROCHLORIDE 10 MG: 5 TABLET ORAL at 02:12

## 2020-09-13 RX ADMIN — SODIUM CHLORIDE: 9 INJECTION, SOLUTION INTRAVENOUS at 08:31

## 2020-09-13 RX ADMIN — LEVOTHYROXINE SODIUM 75 MCG: 0.07 TABLET ORAL at 05:36

## 2020-09-13 RX ADMIN — Medication 10 ML: at 00:00

## 2020-09-13 RX ADMIN — SUGAMMADEX 200 MG: 100 INJECTION, SOLUTION INTRAVENOUS at 10:45

## 2020-09-13 RX ADMIN — ROCURONIUM BROMIDE 20 MG: 10 INJECTION INTRAVENOUS at 09:30

## 2020-09-13 RX ADMIN — Medication 0.6 MG: at 10:25

## 2020-09-13 RX ADMIN — ROCURONIUM BROMIDE 30 MG: 10 INJECTION INTRAVENOUS at 08:34

## 2020-09-13 RX ADMIN — DONEPEZIL HYDROCHLORIDE 10 MG: 10 TABLET, FILM COATED ORAL at 21:28

## 2020-09-13 RX ADMIN — DEXAMETHASONE SODIUM PHOSPHATE 4 MG: 4 INJECTION, SOLUTION INTRAMUSCULAR; INTRAVENOUS at 09:07

## 2020-09-13 RX ADMIN — Medication 10 ML: at 13:07

## 2020-09-13 RX ADMIN — EPHEDRINE SULFATE 5 MG: 50 INJECTION, SOLUTION INTRAMUSCULAR; INTRAVENOUS; SUBCUTANEOUS at 09:18

## 2020-09-13 RX ADMIN — TRAMADOL HYDROCHLORIDE 25 MG: 50 TABLET, FILM COATED ORAL at 00:09

## 2020-09-13 RX ADMIN — ACETAMINOPHEN 650 MG: 650 SOLUTION ORAL at 05:36

## 2020-09-13 RX ADMIN — DOCUSATE SODIUM 50 MG AND SENNOSIDES 8.6 MG 1 TABLET: 8.6; 5 TABLET, FILM COATED ORAL at 21:28

## 2020-09-13 RX ADMIN — Medication 3 MG: at 10:25

## 2020-09-13 RX ADMIN — EPHEDRINE SULFATE 5 MG: 50 INJECTION, SOLUTION INTRAMUSCULAR; INTRAVENOUS; SUBCUTANEOUS at 08:55

## 2020-09-13 RX ADMIN — ACETAMINOPHEN 650 MG: 650 SOLUTION ORAL at 17:59

## 2020-09-13 RX ADMIN — SODIUM CHLORIDE: 450 INJECTION, SOLUTION INTRAVENOUS at 11:21

## 2020-09-13 RX ADMIN — DONEPEZIL HYDROCHLORIDE 10 MG: 10 TABLET, FILM COATED ORAL at 00:08

## 2020-09-13 RX ADMIN — MEMANTINE HYDROCHLORIDE 10 MG: 5 TABLET ORAL at 13:05

## 2020-09-13 RX ADMIN — ENOXAPARIN SODIUM 30 MG: 30 INJECTION SUBCUTANEOUS at 18:24

## 2020-09-13 RX ADMIN — EPHEDRINE SULFATE 5 MG: 50 INJECTION, SOLUTION INTRAMUSCULAR; INTRAVENOUS; SUBCUTANEOUS at 09:22

## 2020-09-13 ASSESSMENT — PULMONARY FUNCTION TESTS
PIF_VALUE: 1
PIF_VALUE: 19
PIF_VALUE: 21
PIF_VALUE: 19
PIF_VALUE: 18
PIF_VALUE: 18
PIF_VALUE: 19
PIF_VALUE: 1
PIF_VALUE: 18
PIF_VALUE: 19
PIF_VALUE: 20
PIF_VALUE: 18
PIF_VALUE: 19
PIF_VALUE: 20
PIF_VALUE: 19
PIF_VALUE: 20
PIF_VALUE: 19
PIF_VALUE: 20
PIF_VALUE: 22
PIF_VALUE: 19
PIF_VALUE: 18
PIF_VALUE: 19
PIF_VALUE: 22
PIF_VALUE: 20
PIF_VALUE: 19
PIF_VALUE: 0
PIF_VALUE: 19
PIF_VALUE: 18
PIF_VALUE: 19
PIF_VALUE: 22
PIF_VALUE: 18
PIF_VALUE: 19
PIF_VALUE: 18
PIF_VALUE: 19
PIF_VALUE: 19
PIF_VALUE: 0
PIF_VALUE: 1
PIF_VALUE: 2
PIF_VALUE: 18
PIF_VALUE: 19
PIF_VALUE: 0
PIF_VALUE: 19
PIF_VALUE: 20
PIF_VALUE: 19
PIF_VALUE: 19
PIF_VALUE: 18
PIF_VALUE: 19
PIF_VALUE: 18
PIF_VALUE: 19
PIF_VALUE: 20
PIF_VALUE: 17
PIF_VALUE: 20
PIF_VALUE: 2
PIF_VALUE: 19
PIF_VALUE: 20
PIF_VALUE: 19
PIF_VALUE: 20
PIF_VALUE: 19
PIF_VALUE: 20
PIF_VALUE: 17
PIF_VALUE: 19
PIF_VALUE: 20
PIF_VALUE: 19
PIF_VALUE: 18
PIF_VALUE: 18
PIF_VALUE: 19
PIF_VALUE: 19
PIF_VALUE: 18
PIF_VALUE: 19
PIF_VALUE: 20
PIF_VALUE: 3
PIF_VALUE: 19
PIF_VALUE: 19
PIF_VALUE: 1
PIF_VALUE: 7
PIF_VALUE: 20
PIF_VALUE: 18
PIF_VALUE: 19
PIF_VALUE: 18
PIF_VALUE: 18
PIF_VALUE: 3
PIF_VALUE: 19
PIF_VALUE: 8
PIF_VALUE: 19
PIF_VALUE: 20
PIF_VALUE: 17
PIF_VALUE: 20
PIF_VALUE: 20
PIF_VALUE: 19
PIF_VALUE: 20
PIF_VALUE: 19
PIF_VALUE: 20
PIF_VALUE: 18
PIF_VALUE: 1
PIF_VALUE: 19
PIF_VALUE: 19
PIF_VALUE: 18
PIF_VALUE: 20
PIF_VALUE: 18
PIF_VALUE: 21
PIF_VALUE: 18
PIF_VALUE: 19
PIF_VALUE: 18
PIF_VALUE: 19
PIF_VALUE: 19
PIF_VALUE: 20
PIF_VALUE: 19
PIF_VALUE: 18
PIF_VALUE: 7
PIF_VALUE: 18
PIF_VALUE: 2
PIF_VALUE: 20
PIF_VALUE: 17
PIF_VALUE: 19
PIF_VALUE: 19

## 2020-09-13 ASSESSMENT — PAIN SCALES - PAIN ASSESSMENT IN ADVANCED DEMENTIA (PAINAD)
BREATHING: 0
CONSOLABILITY: 0
FACIALEXPRESSION: 0
TOTALSCORE: 0
BREATHING: 0
BREATHING: 0
FACIALEXPRESSION: 0
TOTALSCORE: 0
CONSOLABILITY: 0
BODYLANGUAGE: 0
NEGVOCALIZATION: 0
TOTALSCORE: 0
CONSOLABILITY: 0
FACIALEXPRESSION: 0
BODYLANGUAGE: 0
BODYLANGUAGE: 0
FACIALEXPRESSION: 0
NEGVOCALIZATION: 0
CONSOLABILITY: 0
BODYLANGUAGE: 0
FACIALEXPRESSION: 0
NEGVOCALIZATION: 0
NEGVOCALIZATION: 0
TOTALSCORE: 0
NEGVOCALIZATION: 0
CONSOLABILITY: 0
FACIALEXPRESSION: 0
TOTALSCORE: 0
NEGVOCALIZATION: 0
BREATHING: 0
CONSOLABILITY: 0
CONSOLABILITY: 0
BREATHING: 0
NEGVOCALIZATION: 0
BODYLANGUAGE: 0
TOTALSCORE: 0
BODYLANGUAGE: 0
BREATHING: 0
TOTALSCORE: 0
BODYLANGUAGE: 0
BODYLANGUAGE: 0
NEGVOCALIZATION: 0
FACIALEXPRESSION: 0
TOTALSCORE: 0
TOTALSCORE: 0
BREATHING: 0
BODYLANGUAGE: 0
CONSOLABILITY: 0
NEGVOCALIZATION: 0
NEGVOCALIZATION: 0
BREATHING: 0
TOTALSCORE: 0
BODYLANGUAGE: 0
FACIALEXPRESSION: 0
FACIALEXPRESSION: 0
NEGVOCALIZATION: 0
CONSOLABILITY: 0
BREATHING: 0
CONSOLABILITY: 0
BODYLANGUAGE: 0
CONSOLABILITY: 0
TOTALSCORE: 0

## 2020-09-13 ASSESSMENT — PAIN SCALES - GENERAL
PAINLEVEL_OUTOF10: 0
PAINLEVEL_OUTOF10: 4
PAINLEVEL_OUTOF10: 0
PAINLEVEL_OUTOF10: 0
PAINLEVEL_OUTOF10: 4
PAINLEVEL_OUTOF10: 0

## 2020-09-13 ASSESSMENT — LIFESTYLE VARIABLES: SMOKING_STATUS: 0

## 2020-09-13 NOTE — PROGRESS NOTES
Skin color, texture, turgor normal.  No rashes or lesions. Neurologic:  Neurovascularly intact without any focal sensory/motor deficits. Cranial nerves: II-XII intact, grossly non-focal.  Psychiatric: Alert and oriented, thought content appropriate, normal insight  Capillary Refill: Brisk,< 3 seconds   Peripheral Pulses: +2 palpable, equal bilaterally       Labs:   Recent Labs     09/12/20 2037   WBC 12.8*   HGB 12.4   HCT 38.2        Recent Labs     09/12/20 2037      K 4.0      CO2 24   BUN 15   CREATININE 1.1   CALCIUM 8.8     No results for input(s): AST, ALT, BILIDIR, BILITOT, ALKPHOS in the last 72 hours. Recent Labs     09/12/20 2037   INR 1.03     No results for input(s): Laverda Dancer in the last 72 hours. Urinalysis:      Lab Results   Component Value Date    NITRU POSITIVE 09/12/2020    WBCUA 0-2 09/12/2020    BACTERIA 4+ 09/12/2020    RBCUA 0-2 09/12/2020    BLOODU Negative 09/12/2020    SPECGRAV 1.020 09/12/2020    GLUCOSEU Negative 09/12/2020       Radiology:  XR HIP 2-3 VW W PELVIS LEFT   Final Result      Postoperative left hip arthroplasty      XR CHEST PORTABLE   Final Result      No acute cardiopulmonary findings. XR KNEE LEFT (1-2 VIEWS)   Final Result      No acute osseous findings. XR PELVIS (1-2 VIEWS)   Final Result      Left femoral neck displaced and varus angulated fracture. CT Head WO Contrast   Final Result      1. No acute intracranial hemorrhage or mass effect. 2.  Moderate atrophy. 3.  Mild chronic small vessel ischemia. Assessment/Plan:    Active Hospital Problems    Diagnosis    Closed fracture of neck of left femur ([address] [S72.002A]    Other fracture of left femur, initial encounter for closed fracture ([address] [S72.8X2A]     1.   This is a 80-year-old female with a past medical history of Alzheimer dementia, hypothyroidism, depression nursing home resident admitted after a fall and left femur fracture status post repair today, postop care per orthopedic. 2.  Alzheimer dementia continue with home medication. 3.  Hypothyroidism on Synthroid.     DVT Prophylaxis: Per orthopedic  Diet: Diet NPO Time Specified  Dietary Nutrition Supplements: Standard High Calorie Oral Supplement  Code Status: DNR-CCA    PT/OT Eval Status:     Sheila Vásquez MD

## 2020-09-13 NOTE — PROGRESS NOTES
[facility] Orthopedic Surgery  Consult Note          Orthopedic Consult, full note to follow in am.    Neeru Mendez 84 y. o. well known to me and also her family admitted for a fall at [facility] with left hip fracture. Tila Kyle reviewed, and showed a displaced left femoral neck fracture. Plan:  - Recommend left hip hemiarthroplasty. - Surgery tomorrow 8:00 AM.  - D/W with her daughter in law Juanito Madden. Thank you very much for the kind consultation and allowing me to participate in this patient's care. I will continue to keep you apprised of her progress.          Dallas Lynn MD 9/12/2020 9:23 PM

## 2020-09-13 NOTE — CONSULTS
mg by mouth nightly    Yes Historical Provider, MD   bisacodyl (DULCOLAX) 10 MG suppository Place 10 mg rectally daily    Historical Provider, MD   magnesium hydroxide (MILK OF MAGNESIA) 400 MG/5ML suspension Take 30 mLs by mouth 3 times daily as needed for Constipation    Historical Provider, MD ruizFENesin-dextromethorphan (ROBITUSSIN DM) 100-10 MG/5ML syrup Take 10 mLs by mouth 4 times daily as needed for Cough    Historical Provider, MD   aspirin 325 MG EC tablet Take 325 mg by mouth daily.     Historical Provider, MD       Current Medications:   Current Facility-Administered Medications: clindamycin (CLEOCIN) 900 mg in dextrose 5 % 50 mL IVPB, 900 mg, Intravenous, On Call to OR  cefTRIAXone (ROCEPHIN) 1 g IVPB in 50 mL D5W minibag, 1 g, Intravenous, Q12H  donepezil (ARICEPT) tablet 10 mg, 10 mg, Oral, Nightly  escitalopram (LEXAPRO) tablet 5 mg, 5 mg, Oral, Daily  levothyroxine (SYNTHROID) tablet 75 mcg, 75 mcg, Oral, Daily  memantine (NAMENDA) tablet 10 mg, 10 mg, Oral, BID  melatonin tablet 3 mg, 3 mg, Oral, Nightly PRN  sodium chloride flush 0.9 % injection 10 mL, 10 mL, Intravenous, 2 times per day  sodium chloride flush 0.9 % injection 10 mL, 10 mL, Intravenous, PRN  polyethylene glycol (GLYCOLAX) packet 17 g, 17 g, Oral, Daily PRN  promethazine (PHENERGAN) tablet 12.5 mg, 12.5 mg, Oral, Q6H PRN **OR** ondansetron (ZOFRAN) injection 4 mg, 4 mg, Intravenous, Q6H PRN  enoxaparin (LOVENOX) injection 30 mg, 30 mg, Subcutaneous, Daily  acetaminophen (TYLENOL) 160 MG/5ML solution 650 mg, 650 mg, Oral, Q6H  traMADol (ULTRAM) tablet 25 mg, 25 mg, Oral, Q6H PRN  morphine (PF) injection 2 mg, 2 mg, Intravenous, Q2H PRN  ceFAZolin (ANCEF) 1,000 mg in dextrose 5 % 50 mL IVPB (mini-bag), 1,000 mg, Intravenous, On Call to OR    Allergies:  Pcn [penicillins] and Sulfa antibiotics    Social History     Socioeconomic History    Marital status:      Spouse name: Not on file    Number of children: 3    Years of education: Not on file    Highest education level: Not on file   Occupational History    Occupation: Retired   Social Needs    Financial resource strain: Not on file    Food insecurity     Worry: Not on file     Inability: Not on file   [employer] needs     Medical: Not on file     Non-medical: Not on file   Tobacco Use    Smoking status: Never Smoker    Smokeless tobacco: Never Used   Substance and Sexual Activity    Alcohol use: Yes    Drug use: No    Sexual activity: Not on file   Lifestyle    Physical activity     Days per week: Not on file     Minutes per session: Not on file    Stress: Not on file   Relationships    Social connections     Talks on phone: Not on file     Gets together: Not on file     Attends Yazdanism service: Not on file     Active member of club or organization: Not on file     Attends meetings of clubs or organizations: Not on file     Relationship status: Not on file    Intimate partner violence     Fear of current or ex partner: Not on file     Emotionally abused: Not on file     Physically abused: Not on file     Forced sexual activity: Not on file   Other Topics Concern    Not on file   Social History Narrative    Not on file       Family History:  History reviewed. No pertinent family history. REVIEW OF SYSTEMS:   CONSTITUTIONAL: Denies unexplained weight loss, fevers, chills or fatigue  NEUROLOGICAL: Denies unsteady gait or progressive weakness    PSYCHOLOGICAL: Denies anxiety, depression   SKIN: Denies skin changes, delayed healing, rash, itching   HEMATOLOGIC: Denies easy bleeding or bruising  ENDOCRINE: Denies excessive thirst, urination, heat/cold  RESPIRATORY: Denies current dyspnea, cough  CARDIOVASCULAR: Negative for chest pain at this time. EYES: Negative for photophobia and visual disturbance. ENT:  Negative for rhinorrhea, epistaxis, sore throat, or hearing loss.   GI: Denies nausea, vomiting, diarrhea   : Denies bowel or bladder issues MCV 87.8 09/12/2020    MCH 28.6 09/12/2020    MCHC 32.6 09/12/2020    RDW 14.2 09/12/2020     09/12/2020    MPV 7.4 09/12/2020     WBC:    Lab Results   Component Value Date    WBC 12.8 09/12/2020     PT/INR:    Lab Results   Component Value Date    PROTIME 11.9 09/12/2020    INR 1.03 09/12/2020     PTT:    Lab Results   Component Value Date    APTT 30.0 09/12/2020   [APTT    IMAGING: X-rays were taken 9/12/2020, 3 views of the left hip and AP pelvis, was reviewed and showed a displaced left femoral neck fracture. IMPRESSION: Left hip displaced femoral neck fracture. PLAN:   I discussed the overall alignment of the fracture with the patient, and treatment options including both surgical and non-surgical treatment, and that my recommendation would be a hip hemiarthroplasty given the amount of displacement of the fracture. I discussed the risks and benefits of surgery with the patient, including but not limited to infection, bleeding, pain, injury to nerves or blood vessels failure of the surgery and need for additional surgery. All the patient's questions were answered. We discussed an expected post-operative course. She is understanding of this and wishes to proceed. Surgery today as she is medically stable    Thank you very much for the kind consultation and allowing me to participate in this patient's care. I will continue to keep you apprised of her progress.         Cindy Rosa MD   9/13/2020  7:25 AM

## 2020-09-13 NOTE — OP NOTE
[address]                                OPERATIVE REPORT    PATIENT NAME: Lois Mcpherson                     :        1935  MED REC NO:   [id number]                          ROOM:       5726  ACCOUNT NO:   [de-identified]                           ADMIT DATE: 2020  PROVIDER:     Calderon Toney MD    DATE OF PROCEDURE:  2020    PRIMARY CARE PROVIDER:  Dr. Ernesto Groves. SURGEON:  Calderon Toney MD    ASSISTANTS:  Kristel Kaur and Kyara Jovel SA. PREOPERATIVE DIAGNOSIS:  Left hip displaced femoral neck fracture. POSTOPERATIVE DIAGNOSIS:  Left hip displaced femoral neck fracture. PROCEDURE DONE:  Open treatment of left hip displaced femoral neck  fracture with bipolar press-fit hemiarthroplasty. ANESTHESIA:  General anesthesia. ESTIMATED BLOOD LOSS:  Less than 100 mL. COMPLICATIONS:  None. APPROACH:  Anterolateral approach. IMPLANT USED:  Cayetano Accolade II, stem size #5 with head size 44 mm  bipolar -4 offset. INDICATION:  This is an 80-year-old white female with dementia, who  sustained a fall at the dementia unit with a left hip pain. Apparently,  she had an x-ray done at the facility that confirmed a hip fracture, and  I was contacted through her family to take care of the patient's injury. She was then transferred to [facility].  The patient was seen as a  consult and recommended bipolar hemiarthroplasty of left hip. All  risks, benefits, and alternatives were discussed with the patient's  family and agreed to proceed with the surgical treatment. DESCRIPTION OF THE PROCEDURE:  The patient's left hip was marked. The  patient received 900 mg of clindamycin IV preoperatively. The patient  was then brought to the operating room and underwent general anesthesia. She was then placed on the right lateral decubitus position with the  left hip up.   An axillary irrigated the subcu with the Pulsavac and closed with a 2-0  and 3-0 Vicryl and skin with 4-0 Monocryl. The Steri-Strips was then  applied. Dressing was applied in the form of Mepilex. The patient tolerated the procedure well and was taken to the Recovery  in stable condition. POSTOPERATIVE PLAN:  The patient will be readmitted as an inpatient. We  will start PT and OT with weightbearing as tolerated. She will need  rehab placement.         Familia Vyas MD    D: 09/13/2020 10:59:23       T: 09/13/2020 13:48:42     /[email or web address]  Job#: [id number]     Doc#: [id number]    CC:  Charles Barnett

## 2020-09-13 NOTE — PROGRESS NOTES
PACU Transfer Note    Vitals:    09/13/20 1115   BP: 133/62   Pulse: 120   Resp: 13   Temp: 96 °F (35.6 °C)   SpO2: 100%       In: 1059 [I.V.:1059]  Out: 350 [Urine:350]    Pain assessment:   Pain Level: 0    Report given to Receiving unit RN.    9/13/2020 11:32 AM

## 2020-09-13 NOTE — PROGRESS NOTES
Pt admitted from Ed. Connected to telemetry monitoring. Vital signs and head-to-toe assessment performed. Oriented to room, call-light, routine of nurse+physician rounding, frequency of VS/assessments, meal-times, lab-draws, and medication administrations.

## 2020-09-13 NOTE — BRIEF OP NOTE
Brief Postoperative Note      Patient: Zac Hernández  YOB: 1935  MRN: [id number]    Date of Procedure: 9/13/2020    Pre-Op Diagnosis: LEFT HIP DISPLACED FEMORAL NECK FRACTURE    Post-Op Diagnosis: Same       Procedure(s):  LEFT HIP HEMIARTHROPLASTY    Surgeon(s):  Mi Brady MD    Assistant:  Surgical Assistant: Naldo Sloan , RN    Anesthesia: General    Estimated Blood Loss (mL): less than 520     Complications: None    Specimens:   ID Type Source Tests Collected by Time Destination   A : LEFT FEMORAL HEAD Bone Bone SURGICAL PATHOLOGY Mi Brady MD 9/13/2020 9191        Implants:  * No implants in log *      Drains:   Urethral Catheter Double-lumen (Active)   Catheter Indications Perioperative use in selected surgeries including but not limited to urologic, pelvic or need for intraoperative monitoring of urinary output due to prolonged surgery, large volume infusion or need for diuretic therapy in surgery 09/13/20 0815   Site Assessment Pink 09/13/20 0815   Urine Color Yellow 09/13/20 0815   Urine Appearance Clear 09/13/20 0815   Output (mL) 300 mL 09/12/20 2321       Findings: SAME    Electronically signed by Mi Brady MD on 9/13/2020 at 10:04 AM

## 2020-09-13 NOTE — H&P
Hospital Medicine History & Physical      PCP: Heather Garcia    Date of Admission: 9/12/2020    Date of Service: Pt seen/examined on 9/12/2020 at [facility], [facility].    Chief Complaint: Left femur fracture      History Of Present Illness:    80 y.o. female with history of significant Alzheimer's dementia and current resident of Alzheimer's care facility presents after having fall at facility. Initially thought to potentially have sprained ankle. After bruising on knee noted patient was sent to emergency room for further evaluation. Work-up demonstrated left femoral fracture. Family friend is acquainted with Dr. Alysha Nielson by family over family friend who was subsequently called and will see patient in morning. Past Medical History:        Diagnosis Date    Anemia     Dementia ([address]     Hypertension     Thyroid disease        Past Surgical History:        Procedure Laterality Date    EYE SURGERY      HYSTERECTOMY         Medications Prior to Admission:   Prior to Admission medications    Medication Sig Start Date End Date Taking?  Authorizing Provider   vitamin D (VITAMIN D-1000 MAX ST) 25 MCG (1000 UT) TABS tablet Take 1,000 Units by mouth daily   Yes Historical Provider, MD   loratadine (CLARITIN) 10 MG capsule Take 10 mg by mouth daily   Yes Historical Provider, MD   melatonin 3 MG TABS tablet Take 6 mg by mouth nightly as needed   Yes Historical Provider, MD   memantine ER (NAMENDA XR) 28 MG CP24 extended release capsule Take by mouth daily   Yes Historical Provider, MD   acetaminophen (TYLENOL) 325 MG tablet Take 650 mg by mouth 3 times daily as needed for Pain   Yes Historical Provider, MD   levothyroxine (SYNTHROID) 75 MCG tablet Take 75 mcg by mouth daily    Yes Historical Provider, MD   donepezil (ARICEPT) 5 MG tablet Take 10 mg by mouth nightly    Yes Historical Provider, MD   bisacodyl (DULCOLAX) 10 MG suppository Place 10 mg rectally daily    Historical Provider, MD   magnesium hydroxide (MILK OF MAGNESIA) 400 MG/5ML suspension Take 30 mLs by mouth 3 times daily as needed for Constipation    Historical Provider, MD   guaiFENesin-dextromethorphan (ROBITUSSIN DM) 100-10 MG/5ML syrup Take 10 mLs by mouth 4 times daily as needed for Cough    Historical Provider, MD   aspirin 325 MG EC tablet Take 325 mg by mouth daily. Historical Provider, MD       Allergies:  Pcn [penicillins] and Sulfa antibiotics    Social History:      The patient currently lives at [facility]  Social History     Tobacco Use    Smoking status: Never Smoker    Smokeless tobacco: Never Used   Substance Use Topics    Alcohol use: Yes    Drug use: No           Family History:      Positive as follows:    History reviewed. No pertinent family history. REVIEW OF SYSTEMS:   Pertinent positives as noted in the HPI. All other systems reviewed and negative. PHYSICAL EXAM PERFORMED:    BP (!) 112/58   Pulse 71   Temp 98.1 °F (36.7 °C) (Oral)   Resp 16   Ht 5' 6\" (1.676 m)   Wt 110 lb (49.9 kg)   SpO2 94%   Breastfeeding No   BMI 17.75 kg/m²     General appearance:  No apparent distress, appears stated age and cooperative. HEENT:  Normal cephalic, atraumatic without obvious deformity. Pupils equal, round, and reactive to light. Extra ocular muscles intact. Conjunctivae/corneas clear. Neck: Supple, with full range of motion. No jugular venous distention. Trachea midline. Respiratory:  Normal respiratory effort. Clear to auscultation, bilaterally without Rales/Wheezes/Rhonchi. Cardiovascular:  Regular rate and rhythm with normal S1/S2 without murmurs, rubs or gallops. Abdomen: Soft, non-tender, non-distended with normal bowel sounds. Musculoskeletal:  No clubbing, cyanosis or edema bilaterally. Full range of motion without deformity. Skin: Skin color, texture, turgor normal.  No rashes or lesions. Neurologic:  Neurovascularly intact without any focal sensory/motor deficits.  Cranial nerves: II-XII intact, grossly non-focal.  Psychiatric: Pleasant, alert, oriented to person but not place time or situation. Capillary Refill: Brisk,< 3 seconds   Peripheral Pulses: +2 palpable, equal bilaterally       Labs:     Recent Labs     09/12/20 2037   WBC 12.8*   HGB 12.4   HCT 38.2        Recent Labs     09/12/20 2037      K 4.0      CO2 24   BUN 15   CREATININE 1.1   CALCIUM 8.8     No results for input(s): AST, ALT, BILIDIR, BILITOT, ALKPHOS in the last 72 hours. Recent Labs     09/12/20 2037   INR 1.03     No results for input(s): Yaquelin Moellers in the last 72 hours. Urinalysis:      Lab Results   Component Value Date    NITRU POSITIVE 09/12/2020    WBCUA 0-2 09/12/2020    BACTERIA 4+ 09/12/2020    RBCUA 0-2 09/12/2020    BLOODU Negative 09/12/2020    SPECGRAV 1.020 09/12/2020    GLUCOSEU Negative 09/12/2020       Radiology:     XR CHEST PORTABLE   Final Result      No acute cardiopulmonary findings. XR KNEE LEFT (1-2 VIEWS)   Final Result      No acute osseous findings. XR PELVIS (1-2 VIEWS)   Final Result      Left femoral neck displaced and varus angulated fracture. CT Head WO Contrast   Final Result      1. No acute intracranial hemorrhage or mass effect. 2.  Moderate atrophy. 3.  Mild chronic small vessel ischemia. ASSESSMENT  1. Left displaced femoral neck fracture  2. Abnormal urinalysis without symptoms  3. Hypertension history not treated with antihypertensives  4. Hypothyroidism    PLAN  1. Admit for orthopedics consult and likely surgical intervention. 2. No red flags to contraindicate surgery  3. Patient treated empirically with IV ceftriaxone 1 g for abnormal urinalysis with urine culture pending  4. Continue home memantine, donepezil, melatonin.   5. Continue home levothyroxine supplement       DVT Prophylaxis: Lovenox following procedure  Diet: N.p.o. now  Code Status: DO  [address] arrest.  Will rescind for surgery  Surrogate: Judge Parrish Tiffani Dean, [facility] -admitted to [facility] 5 bed for orthopedic consult and planned surgery. Anticipate greater than 2 midnight stay. Zo Lord MD    Thank you Lin Calero for the opportunity to be involved in this patient's care. If you have any questions or concerns please feel free to contact me at [phone].

## 2020-09-13 NOTE — PROGRESS NOTES
Admitted to pacu at this time. Pt asleep. Has severe dementia and communicates poorly. Yes and no responses. VSS on monitor.  Report given by anesthesia provider Dr. Hyde Mess

## 2020-09-13 NOTE — PROGRESS NOTES
Discussed diet with Dr. Ryan Castanon via perfect serve. Paged Dr. Hanny Hood as requested to see if pt can have a diet. Awaiting response.

## 2020-09-13 NOTE — ANESTHESIA PRE PROCEDURE
Department of Anesthesiology  Preprocedure Note       Name:  Isabelle Woodward   Age:  80 y.o.  :  1935                                          MRN:  [id number]         Date:  2020      Surgeon: Jeanna Mccoy):  Carmen Larson MD    Procedure: Procedure(s):  LEFT HIP HEMIARTHROPLASTY    Medications prior to admission:   Prior to Admission medications    Medication Sig Start Date End Date Taking? Authorizing Provider   vitamin D (VITAMIN D-1000 MAX ST) 25 MCG (1000 UT) TABS tablet Take 1,000 Units by mouth daily   Yes Historical Provider, MD   loratadine (CLARITIN) 10 MG capsule Take 10 mg by mouth daily   Yes Historical Provider, MD   melatonin 3 MG TABS tablet Take 6 mg by mouth nightly as needed   Yes Historical Provider, MD   memantine ER (NAMENDA XR) 28 MG CP24 extended release capsule Take by mouth daily   Yes Historical Provider, MD   acetaminophen (TYLENOL) 325 MG tablet Take 650 mg by mouth 3 times daily as needed for Pain   Yes Historical Provider, MD   levothyroxine (SYNTHROID) 75 MCG tablet Take 75 mcg by mouth daily    Yes Historical Provider, MD   donepezil (ARICEPT) 5 MG tablet Take 10 mg by mouth nightly    Yes Historical Provider, MD   bisacodyl (DULCOLAX) 10 MG suppository Place 10 mg rectally daily    Historical Provider, MD   magnesium hydroxide (MILK OF MAGNESIA) 400 MG/5ML suspension Take 30 mLs by mouth 3 times daily as needed for Constipation    Historical Provider, MD   guaiFENesin-dextromethorphan (ROBITUSSIN DM) 100-10 MG/5ML syrup Take 10 mLs by mouth 4 times daily as needed for Cough    Historical Provider, MD   aspirin 325 MG EC tablet Take 325 mg by mouth daily.     Historical Provider, MD       Current medications:    Current Facility-Administered Medications   Medication Dose Route Frequency Provider Last Rate Last Dose    fentaNYL (SUBLIMAZE) injection 25 mcg  25 mcg Intravenous Q5 Min PRN Naty Ramos DO        fentaNYL (SUBLIMAZE) injection 50 mcg  50 mcg Intravenous Q5 Min PRN Jhoan Ritchie, [employer]        HYDROmorphone (DILAUDID) injection 0.25 mg  0.25 mg Intravenous Q5 Min PRN Sandyia Chau, DO        HYDROmorphone (DILAUDID) injection 0.5 mg  0.5 mg Intravenous Q5 Min PRN Sandyia Chau, DO        oxyCODONE-acetaminophen (PERCOCET) 5-325 MG per tablet 1 tablet  1 tablet Oral Once PRN Yaniqueancia Chau, DO        ondansetron [facility]) injection 4 mg  4 mg Intravenous Once PRN Yaniqueancia Chau, DO        promethazine (PHENERGAN) injection 6.25 mg  6.25 mg Intramuscular Once PRN Sandyia Chau, DO        labetalol (NORMODYNE;TRANDATE) injection syringe 5 mg  5 mg Intravenous Q10 Min PRN Jhoan Barretto, DO        hydrALAZINE (APRESOLINE) injection 5 mg  5 mg Intravenous Q10 Min PRN Sandyia Chau, DO        cefTRIAXone (ROCEPHIN) 1 g IVPB in 50 mL D5W minibag  1 g Intravenous Q12H Florence Lang MD 0 mL/hr at 09/12/20 2238 1 g at 09/13/20 0852    donepezil (ARICEPT) tablet 10 mg  10 mg Oral Nightly Kiel Singletary MD   10 mg at 09/13/20 0008    escitalopram (LEXAPRO) tablet 5 mg  5 mg Oral Daily Kiel Singletary MD        levothyroxine (SYNTHROID) tablet 75 mcg  75 mcg Oral Daily Cindy Pagan MD   75 mcg at 09/13/20 0536    memantine (NAMENDA) tablet 10 mg  10 mg Oral BID Cindy Pagan MD   10 mg at 09/13/20 0212    melatonin tablet 3 mg  3 mg Oral Nightly PRN Cindy Pagan MD   3 mg at 09/13/20 0008    sodium chloride flush 0.9 % injection 10 mL  10 mL Intravenous 2 times per day Cindy Pagan MD   10 mL at 09/13/20 0000    sodium chloride flush 0.9 % injection 10 mL  10 mL Intravenous PRN Cindy Pagan MD        polyethylene glycol (GLYCOLAX) packet 17 g  17 g Oral Daily PRN Kiel Singletary MD        promethazine (PHENERGAN) tablet 12.5 mg  12.5 mg Oral Q6H PRN Kiel Singletary MD        Or    ondansetron (ZOFRAN) injection 4 mg  4 mg Intravenous Q6H PRN Kiel Singletary MD        enoxaparin (LOVENOX) injection 30 mg  30 mg Subcutaneous Daily Kiel Honeycutt MD        acetaminophen (TYLENOL) 160 MG/5ML solution 650 mg  650 mg Oral Q6H Kiel Ceja MD   650 mg at 09/13/20 0536    traMADol (ULTRAM) tablet 25 mg  25 mg Oral Q6H PRN Pantera Blanton MD   25 mg at 09/13/20 0009    morphine (PF) injection 2 mg  2 mg Intravenous Q2H PRN Kiel Honeycutt MD        ceFAZolin (ANCEF) 1,000 mg in dextrose 5 % 50 mL IVPB (mini-bag)  1,000 mg Intravenous On Call to 200 Ryan Martinez MD           Allergies: Allergies   Allergen Reactions    Pcn [Penicillins]     Sulfa Antibiotics        Problem List:    Patient Active Problem List   Diagnosis Code    Closed fracture of part of upper end of humerus S42.209A    Closed nondisplaced fracture of proximal phalanx of left little finger S62.647A    Other fracture of left femur, initial encounter for closed fracture ([address] S72.8X2A    Closed fracture of neck of left femur ([address] S72.002A       Past Medical History:        Diagnosis Date    Anemia     Dementia ([address]     Hypertension     Thyroid disease        Past Surgical History:        Procedure Laterality Date    EYE SURGERY      HYSTERECTOMY         Social History:    Social History     Tobacco Use    Smoking status: Never Smoker    Smokeless tobacco: Never Used   Substance Use Topics    Alcohol use:  Yes                                Counseling given: Not Answered      Vital Signs (Current):   Vitals:    09/12/20 2200 09/12/20 2316 09/13/20 0318 09/13/20 0814   BP: 128/71 133/72 (!) 112/58 (!) 114/45   Pulse: 76 77 71 68   Resp: 20 18 16 15   Temp:  97.6 °F (36.4 °C) 98.1 °F (36.7 °C) 98 °F (36.7 °C)   TempSrc:  Oral Oral Oral   SpO2: 96% 95% 94% 93%   Weight:  110 lb (49.9 kg)     Height:  5' 6\" (1.676 m)                                                BP Readings from Last 3 Encounters:   09/13/20 (!) 114/45   09/13/20 (!) 110/53   04/05/18 (!) 140/55       NPO Status: BMI:   Wt Readings from Last 3 Encounters:   09/12/20 110 lb (49.9 kg)   04/12/18 110 lb (49.9 kg)   04/05/18 115 lb (52.2 kg)     Body mass index is 17.75 kg/m². CBC:   Lab Results   Component Value Date    WBC 12.8 09/12/2020    RBC 4.34 09/12/2020    HGB 12.4 09/12/2020    HCT 38.2 09/12/2020    MCV 87.8 09/12/2020    RDW 14.2 09/12/2020     09/12/2020       CMP:   Lab Results   Component Value Date     09/12/2020    K 4.0 09/12/2020     09/12/2020    CO2 24 09/12/2020    BUN 15 09/12/2020    CREATININE 1.1 09/12/2020    GFRAA 57 09/12/2020    GFRAA 47 12/28/2012    AGRATIO 1.5 12/28/2012    LABGLOM 47 09/12/2020    GLUCOSE 124 09/12/2020    PROT 6.8 12/28/2012    CALCIUM 8.8 09/12/2020    BILITOT 0.60 12/28/2012    ALKPHOS 73 12/28/2012     12/28/2012     12/28/2012       POC Tests: No results for input(s): POCGLU, POCNA, POCK, POCCL, POCBUN, POCHEMO, POCHCT in the last 72 hours.     Coags:   Lab Results   Component Value Date    PROTIME 11.9 09/12/2020    INR 1.03 09/12/2020    APTT 30.0 09/12/2020       HCG (If Applicable): No results found for: PREGTESTUR, PREGSERUM, HCG, HCGQUANT     ABGs: No results found for: PHART, PO2ART, GRW7KFP, BJG9KGS, BEART, U1DSNRVX     Type & Screen (If Applicable):  Lab Results   Component Value Date    LABABO AB 12/28/2012    [address] Positive 12/28/2012       Drug/Infectious Status (If Applicable):  No results found for: HIV, HEPCAB    COVID-19 Screening (If Applicable):   Lab Results   Component Value Date    COVID19 Not Detected 09/12/2020         Anesthesia Evaluation  Patient summary reviewed and Nursing notes reviewed no history of anesthetic complications:   Airway: Mallampati: II  TM distance: >3 FB   Neck ROM: limited  Mouth opening: > = 3 FB Dental: normal exam         Pulmonary:       (-) not a current smoker                           Cardiovascular:    (+) hypertension:,         Rhythm: regular  Rate: normal                    Neuro/Psych:                ROS comment: Alzheimer's dementia GI/Hepatic/Renal: Neg GI/Hepatic/Renal ROS            Endo/Other:    (+) hypothyroidism::., .                 Abdominal:           Vascular:                                        Anesthesia Plan      general     ASA 3       Induction: intravenous. MIPS: Prophylactic antiemetics administered. Anesthetic plan and risks discussed with healthcare power of  and child/children.               Case discussed with son; DNR suspended via telephone        Mikey Burrows DO   9/13/2020

## 2020-09-13 NOTE — PROGRESS NOTES
Patient returned to room. Dressing to left hip is clean, dry and intact. Vitals and assessment are as noted. Call light is within pt's reach. Fall precautions were reinstated. Resting quietly in bed.

## 2020-09-13 NOTE — ANESTHESIA POSTPROCEDURE EVALUATION
Department of Anesthesiology  Postprocedure Note    Patient: Yusuf Jefferson  MRN: [id number]  YOB: 1935  Date of evaluation: 9/13/2020  Time:  11:48 AM     Procedure Summary     Date:  09/13/20 Room / Location:  [address]    Anesthesia Start:  0831 Anesthesia Stop:  2165    Procedure:  LEFT HIP HEMIARTHROPLASTY (Left Hip) Diagnosis:  (LEFT HIP FRACTURE)    Surgeon:  Renetta Patel MD Responsible Provider:  Easton Tucker DO    Anesthesia Type:  general ASA Status:  3          Anesthesia Type: No value filed. Mallory Phase I: Mallory Score: 8    Mallory Phase II:      Last vitals: Reviewed and per EMR flowsheets.        Anesthesia Post Evaluation    Patient location during evaluation: PACU  Patient participation: complete - patient participated  Level of consciousness: sleepy but conscious (Alzheimer's dementia)  Airway patency: patent  Nausea & Vomiting: no nausea and no vomiting  Cardiovascular status: blood pressure returned to baseline  Respiratory status: acceptable  Hydration status: euvolemic

## 2020-09-13 NOTE — PROGRESS NOTES
Pt experiencing 6/10 pain with Advanced Delirium pain assessment after transfering to new bed; 25mg of oral tramadol and scheduled 625mg PO tylenol administered; pt asleep with RR >10 on reassessment. Pt hemodynamically stable throughout shift. Mckeon catheter in place; pt had 300 ml of yellow cloudy urine out throughout shift; pt has UTI, urine cultures pending. Spoke with benny Vega and Jono Cross; updated on pt status and plan for sx at [phone]. Verbal consent provided over phone and verified with GELA Cortes.

## 2020-09-14 LAB
HCT VFR BLD CALC: 32.6 % (ref 36–48)
HEMOGLOBIN: 10.6 G/DL (ref 12–16)
REPORT: NORMAL
SARS-COV-2: NOT DETECTED
THIS TEST SENT TO: NORMAL

## 2020-09-14 PROCEDURE — 97166 OT EVAL MOD COMPLEX 45 MIN: CPT

## 2020-09-14 PROCEDURE — 2500000003 HC RX 250 WO HCPCS: Performed by: ORTHOPAEDIC SURGERY

## 2020-09-14 PROCEDURE — 97535 SELF CARE MNGMENT TRAINING: CPT

## 2020-09-14 PROCEDURE — 85018 HEMOGLOBIN: CPT

## 2020-09-14 PROCEDURE — 97530 THERAPEUTIC ACTIVITIES: CPT

## 2020-09-14 PROCEDURE — 2580000003 HC RX 258: Performed by: ORTHOPAEDIC SURGERY

## 2020-09-14 PROCEDURE — 97116 GAIT TRAINING THERAPY: CPT

## 2020-09-14 PROCEDURE — 94640 AIRWAY INHALATION TREATMENT: CPT

## 2020-09-14 PROCEDURE — 85014 HEMATOCRIT: CPT

## 2020-09-14 PROCEDURE — 6360000002 HC RX W HCPCS: Performed by: ORTHOPAEDIC SURGERY

## 2020-09-14 PROCEDURE — 97162 PT EVAL MOD COMPLEX 30 MIN: CPT

## 2020-09-14 PROCEDURE — 6370000000 HC RX 637 (ALT 250 FOR IP): Performed by: ORTHOPAEDIC SURGERY

## 2020-09-14 PROCEDURE — 1200000000 HC SEMI PRIVATE

## 2020-09-14 RX ADMIN — ACETAMINOPHEN 650 MG: 650 SOLUTION ORAL at 23:22

## 2020-09-14 RX ADMIN — MEMANTINE HYDROCHLORIDE 10 MG: 5 TABLET ORAL at 09:57

## 2020-09-14 RX ADMIN — LEVOTHYROXINE SODIUM 75 MCG: 0.07 TABLET ORAL at 06:25

## 2020-09-14 RX ADMIN — SODIUM CHLORIDE: 450 INJECTION, SOLUTION INTRAVENOUS at 06:40

## 2020-09-14 RX ADMIN — CEFTRIAXONE 1 G: 1 INJECTION, POWDER, FOR SOLUTION INTRAMUSCULAR; INTRAVENOUS at 10:02

## 2020-09-14 RX ADMIN — ENOXAPARIN SODIUM 30 MG: 30 INJECTION SUBCUTANEOUS at 18:45

## 2020-09-14 RX ADMIN — ACETAMINOPHEN 650 MG: 650 SOLUTION ORAL at 13:17

## 2020-09-14 RX ADMIN — DOCUSATE SODIUM 50 MG AND SENNOSIDES 8.6 MG 1 TABLET: 8.6; 5 TABLET, FILM COATED ORAL at 09:58

## 2020-09-14 RX ADMIN — ACETAMINOPHEN 650 MG: 650 SOLUTION ORAL at 18:45

## 2020-09-14 RX ADMIN — CLINDAMYCIN IN 5 PERCENT DEXTROSE 900 MG: 18 INJECTION, SOLUTION INTRAVENOUS at 02:21

## 2020-09-14 RX ADMIN — ONDANSETRON 4 MG: 2 INJECTION INTRAMUSCULAR; INTRAVENOUS at 10:08

## 2020-09-14 RX ADMIN — MORPHINE SULFATE 2 MG: 2 INJECTION, SOLUTION INTRAMUSCULAR; INTRAVENOUS at 02:43

## 2020-09-14 RX ADMIN — DONEPEZIL HYDROCHLORIDE 10 MG: 10 TABLET, FILM COATED ORAL at 20:56

## 2020-09-14 RX ADMIN — DOCUSATE SODIUM 50 MG AND SENNOSIDES 8.6 MG 1 TABLET: 8.6; 5 TABLET, FILM COATED ORAL at 20:56

## 2020-09-14 RX ADMIN — TRAMADOL HYDROCHLORIDE 25 MG: 50 TABLET, FILM COATED ORAL at 14:54

## 2020-09-14 RX ADMIN — ESCITALOPRAM 5 MG: 5 TABLET, FILM COATED ORAL at 09:57

## 2020-09-14 RX ADMIN — MEMANTINE HYDROCHLORIDE 10 MG: 5 TABLET ORAL at 20:56

## 2020-09-14 RX ADMIN — TRAMADOL HYDROCHLORIDE 25 MG: 50 TABLET, FILM COATED ORAL at 08:23

## 2020-09-14 RX ADMIN — ACETAMINOPHEN 650 MG: 650 SOLUTION ORAL at 06:24

## 2020-09-14 RX ADMIN — SODIUM CHLORIDE: 450 INJECTION, SOLUTION INTRAVENOUS at 22:06

## 2020-09-14 ASSESSMENT — PAIN SCALES - PAIN ASSESSMENT IN ADVANCED DEMENTIA (PAINAD)
TOTALSCORE: 0
BODYLANGUAGE: 0
TOTALSCORE: 0
NEGVOCALIZATION: 0
TOTALSCORE: 0
NEGVOCALIZATION: 0
FACIALEXPRESSION: 0
NEGVOCALIZATION: 0
CONSOLABILITY: 0
NEGVOCALIZATION: 0
TOTALSCORE: 0
TOTALSCORE: 0
CONSOLABILITY: 0
BODYLANGUAGE: 0
BREATHING: 0
CONSOLABILITY: 0
BREATHING: 0
BODYLANGUAGE: 0
CONSOLABILITY: 0
FACIALEXPRESSION: 0
FACIALEXPRESSION: 0
CONSOLABILITY: 0
FACIALEXPRESSION: 0
BODYLANGUAGE: 0
BREATHING: 0
NEGVOCALIZATION: 0
BODYLANGUAGE: 1
BREATHING: 0
NEGVOCALIZATION: 0
NEGVOCALIZATION: 0
BODYLANGUAGE: 0
CONSOLABILITY: 0
FACIALEXPRESSION: 0
FACIALEXPRESSION: 0
CONSOLABILITY: 0
BODYLANGUAGE: 0
BREATHING: 0
CONSOLABILITY: 0
TOTALSCORE: 0
BREATHING: 0
CONSOLABILITY: 0
FACIALEXPRESSION: 0
NEGVOCALIZATION: 0
FACIALEXPRESSION: 0
BODYLANGUAGE: 0
BREATHING: 0
NEGVOCALIZATION: 0
BREATHING: 0
CONSOLABILITY: 0
TOTALSCORE: 0
BODYLANGUAGE: 0
TOTALSCORE: 0
BODYLANGUAGE: 0
CONSOLABILITY: 0
BREATHING: 0
CONSOLABILITY: 0
FACIALEXPRESSION: 0
TOTALSCORE: 0
FACIALEXPRESSION: 0
FACIALEXPRESSION: 0
TOTALSCORE: 0
BREATHING: 0
BREATHING: 0
CONSOLABILITY: 0
NEGVOCALIZATION: 1
CONSOLABILITY: 0
BREATHING: 0
BODYLANGUAGE: 0
NEGVOCALIZATION: 0
BREATHING: 0
NEGVOCALIZATION: 0
NEGVOCALIZATION: 0
BREATHING: 0
CONSOLABILITY: 0
NEGVOCALIZATION: 0
TOTALSCORE: 0
BODYLANGUAGE: 0
FACIALEXPRESSION: 0
TOTALSCORE: 0
BREATHING: 1
NEGVOCALIZATION: 0
BODYLANGUAGE: 0
FACIALEXPRESSION: 0
TOTALSCORE: 3

## 2020-09-14 ASSESSMENT — PAIN SCALES - GENERAL
PAINLEVEL_OUTOF10: 4
PAINLEVEL_OUTOF10: 4
PAINLEVEL_OUTOF10: 0
PAINLEVEL_OUTOF10: 1
PAINLEVEL_OUTOF10: 3
PAINLEVEL_OUTOF10: 0
PAINLEVEL_OUTOF10: 0
PAINLEVEL_OUTOF10: 4
PAINLEVEL_OUTOF10: 0
PAINLEVEL_OUTOF10: 3
PAINLEVEL_OUTOF10: 4
PAINLEVEL_OUTOF10: 7
PAINLEVEL_OUTOF10: 0

## 2020-09-14 ASSESSMENT — PAIN SCALES - WONG BAKER
WONGBAKER_NUMERICALRESPONSE: 4
WONGBAKER_NUMERICALRESPONSE: 4
WONGBAKER_NUMERICALRESPONSE: 2
WONGBAKER_NUMERICALRESPONSE: 4
WONGBAKER_NUMERICALRESPONSE: 4

## 2020-09-14 NOTE — PROGRESS NOTES
Hospitalist Progress Note      PCP: Theresa Murillo    Date of Admission: 9/12/2020    Chief Complaint: Left femur fracture    Hospital Course: This is a 80-year-old female with a past medical history of Alzheimer dementia nursing home resident admitted after a fall left femur fracture status post repair. PT/OT pending. Subjective:     Denies chest pain, sob, abdominal pain, n/v, diarrhea or constipation, wants to eat breakfast. Denies hip pain also. Has baseline dementia. Medications:  Reviewed    Infusion Medications    sodium chloride 75 mL/hr at 09/14/20 0640     Scheduled Medications    sodium chloride flush  10 mL Intravenous 2 times per day    sennosides-docusate sodium  1 tablet Oral BID    enoxaparin  30 mg Subcutaneous Daily    cefTRIAXone (ROCEPHIN) IV  1 g Intravenous Q12H    donepezil  10 mg Oral Nightly    escitalopram  5 mg Oral Daily    levothyroxine  75 mcg Oral Daily    memantine  10 mg Oral BID    sodium chloride flush  10 mL Intravenous 2 times per day    acetaminophen  650 mg Oral Q6H     PRN Meds: sodium chloride flush, magnesium hydroxide, melatonin, sodium chloride flush, polyethylene glycol, promethazine **OR** ondansetron, traMADol, morphine      Intake/Output Summary (Last 24 hours) at 9/14/2020 0955  Last data filed at 9/14/2020 0949  Gross per 24 hour   Intake 1685.99 ml   Output 1150 ml   Net 535.99 ml       Physical Exam Performed:    BP (!) 106/47   Pulse 81   Temp 97.8 °F (36.6 °C) (Oral)   Resp 16   Ht 5' 6\" (1.676 m)   Wt 110 lb (49.9 kg)   SpO2 90%   Breastfeeding No   BMI 17.75 kg/m²     General appearance: No apparent distress, appears stated age and cooperative. HEENT: Pupils equal, round, and reactive to light. Conjunctivae/corneas clear. Neck: Supple, with full range of motion. No jugular venous distention. Trachea midline. Respiratory:  Normal respiratory effort.  Clear to auscultation, bilaterally without Rales/Wheezes/Rhonchi. Cardiovascular: Regular rate and rhythm with normal S1/S2 without murmurs, rubs or gallops. Abdomen: Soft, non-tender, non-distended with normal bowel sounds. Musculoskeletal: No clubbing, cyanosis or edema bilaterally. Full range of motion without deformity. Skin: Skin color, texture, turgor normal.  No rashes or lesions. Neurologic:  Neurovascularly intact without any focal sensory/motor deficits. Cranial nerves: II-XII intact, grossly non-focal.  Psychiatric: Alert and oriented, thought content appropriate, normal insight  Capillary Refill: Brisk,< 3 seconds   Peripheral Pulses: +2 palpable, equal bilaterally       Labs:   Recent Labs     09/12/20 2037 09/14/20  0635   WBC 12.8*  --    HGB 12.4 10.6*   HCT 38.2 32.6*     --      Recent Labs     09/12/20 2037      K 4.0      CO2 24   BUN 15   CREATININE 1.1   CALCIUM 8.8     No results for input(s): AST, ALT, BILIDIR, BILITOT, ALKPHOS in the last 72 hours. Recent Labs     09/12/20 2037   INR 1.03     No results for input(s): Antonio Andres in the last 72 hours. Urinalysis:      Lab Results   Component Value Date    NITRU POSITIVE 09/12/2020    WBCUA 0-2 09/12/2020    BACTERIA 4+ 09/12/2020    RBCUA 0-2 09/12/2020    BLOODU Negative 09/12/2020    SPECGRAV 1.020 09/12/2020    GLUCOSEU Negative 09/12/2020       Radiology:  XR HIP 2-3 VW W PELVIS LEFT   Final Result      Postoperative left hip arthroplasty      XR CHEST PORTABLE   Final Result      No acute cardiopulmonary findings. XR KNEE LEFT (1-2 VIEWS)   Final Result      No acute osseous findings. XR PELVIS (1-2 VIEWS)   Final Result      Left femoral neck displaced and varus angulated fracture. CT Head WO Contrast   Final Result      1. No acute intracranial hemorrhage or mass effect. 2.  Moderate atrophy. 3.  Mild chronic small vessel ischemia.             Assessment/Plan:    Active Hospital Problems    Diagnosis    Closed fracture of neck of left femur ([facility] Samantha Rivera    Other fracture of left femur, initial encounter for closed fracture ([facility] [S72.8X2A]     1. This is a 80-year-old female with a past medical history of Alzheimer dementia, hypothyroidism, depression nursing home resident admitted after a fall and left femur fracture status post repair, postop care per orthopedic.  -PT/OT pending  -DVT ppx for discharge when appropriate  2. Alzheimer dementia continue with home medication.   3.  Hypothyroidism on Synthroid    DVT Prophylaxis: Per orthopedic, she is started on Lovenox for DVT ppx  Diet: Dietary Nutrition Supplements: Standard High Calorie Oral Supplement  DIET GENERAL;  Dietary Nutrition Supplements: Standard High Calorie Oral Supplement  Code Status: DNR-CCA     PT/OT Eval Status: pending    Dispo - Return to facility with therapy    Nadira Hamilton DO

## 2020-09-14 NOTE — PROGRESS NOTES
NUTRITION ASSESSMENT  Admission Date: 9/12/2020     Type and Reason for Visit: Initial, Positive Nutrition Screen    NUTRITION RECOMMENDATIONS:   1. PO Diet: Continue general diet    2. ONS: Ensure TID  3. Encourage po intakes     NUTRITION ASSESSMENT:  Pt is nutritionally compromised AEB low BMI and poor po intakes. Pt admitted for left femoral fracture, s/p surgery 9/13. H/o Alzheimer's dementia and current resident of [facility]. Unable to see pt d/t being in isolation at this time. Pt has been consuming <25% of meals. RN reports that pt is very confused and ate bites of food for breakfast. Discussed adding ONS for all meal trays and encouraging pt at meal times. Unable to assess wt status d/t lack of data in EMR. Will continue to monitor nutritional status this admission. MALNUTRITION ASSESSMENT  Context of Malnutrition: Acute Illness   Malnutrition Status: Insufficient data(suspect malnutrition)  Findings of the 6 clinical characteristics of malnutrition (Minimum of 2 out of 6 clinical characteristics is required to make the diagnosis of moderate or severe Protein Calorie Malnutrition based on AND/ASPEN Guidelines):  Energy Intake: Less than/equal to 75% of estimated energy requirements    Energy Intake Time: x2 days   Weight Loss %: Unable to assess    Weight loss Time: Unable to assess   Fluid Accumulation: No significant    Fluid Accumulation Location: No significant     Strength: Not Performed;  Not Measured     NUTRITION DIAGNOSIS   Problem: Problem #1: Underweight  Etiology: Insufficient energy/nutrient consumption  Signs & Symptoms: BMI and Intake 0-25%    NUTRITION INTERVENTION  Food and/or Nutrient Delivery:Continue Current diet  or Continue Current ONS   Nutrition education/counseling/coordination of care: Continue Inpatient Monitoring     NUTRITION MONITORING & EVALUATION:  Evaluation:Goals set   Goals:Goals: Pt will consume >/=50% of meals and ONS this admission  Monitoring: Meal Intake , Supplement Intake  or Weight      OBJECTIVE DATA:  · Nutrition-Focused Physical Findings: no edema, advanced dementia/alzheimers   · Wounds Surgical Wound      Past Medical History:   Diagnosis Date    Anemia     Dementia ([address]     Hypertension     Thyroid disease         ANTHROPOMETRICS  Current Height: 5' 6\" (167.6 cm)  Current Weight: 110 lb (49.9 kg)    Admission weight: 110 lb (49.9 kg)  Ideal Bodyweight 130 lbs  Weight Changes ROJAS       BMI BMI (Calculated): 17.8    Wt Readings from Last 50 Encounters:   09/12/20 110 lb (49.9 kg)     COMPARATIVE STANDARDS  Estimated Total Kcals/Day : 30-35 Current Bodyweight (50 kg) 1092-4642 kcal    Estimated Total Protein (g/day) : 1.5-2.0 Current Bodyweight (50 kg)  g/day  Estimated Daily Total Fluid (ml/day): 1 mL/kcal per day     Food / Nutrition-Related History  Pre-Admission / Home Diet:  Pre-Admission/Home Diet: Extended Care Facility   Home Supplements / Bhavana Miller:    none noted  Food Restrictions / Cultural Requests:    none noted    Diet Orders / Intake / Nutrition Support  Current diet/supplement order: DIET GENERAL;  Dietary Nutrition Supplements: Standard High Calorie Oral Supplement     NSG Recorded PO:   PO Fluids P.O.: 120 mL  PO Meals PO Meals Eaten (%): 1 - 25%   PO Intake: 1-25%  PO Supplement: Standard High Calorie    PO Supplement Intake: ROJAS  IVF: 75 ml/hr     NUTRITION RISK LEVEL: Risk Level:  Moderate     Bright Robledo, [address] [place] [address]  Brennen:  [phone]  Office:  [phone]

## 2020-09-14 NOTE — PLAN OF CARE
Nutrition Problem #1: Underweight  Intervention: Food and/or Nutrient Delivery: Continue Current Diet, Continue Oral Nutrition Supplement  Nutritional Goals: Pt will consume >/=50% of meals and ONS this admission

## 2020-09-14 NOTE — PROGRESS NOTES
Patient has pulled out several I.V.'s this shift. She has now pulled out her arroyo catheter. M.D. made aware. Will leave out for now. No trauma noted.

## 2020-09-14 NOTE — PROGRESS NOTES
Clinical Pharmacy Progress Note  Medication History     Admit Date: 9/12/2020    List of of current medications patient is taking is complete. Home Medication list in EPIC updated to reflect changes noted below. Source of information: medication list from [facility]; list dated 8/31/20    Changes made to medication list:   Medications removed: (include reason, ex: therapy completed, inactive medication)   Aspirin - not on med list from facility  Medication doses adjusted:    Loratadine - frequency is daily PRN   Memantine ER - dose is 21 mg daily   Melatonin - pt takes nightly, not PRN   Bisacodyl AR    Please call with any questions.   Ernin Montiel, PharmD, [address]  Wireless # [phone]  9/14/2020 12:03 PM

## 2020-09-14 NOTE — PROGRESS NOTES
Occupational Therapy   Occupational Therapy Initial Assessment/ Treatment  Date: 2020   Patient Name: Magi Bingham  MRN: [id number]     : 1935    Date of Service: 2020    Discharge Recommendations:    Magi Bingham scored a  on the AM-PAC ADL Inpatient form. Current research shows that an AM-PAC score of 17 or less is typically not associated with a discharge to the patient's home setting. Based on the patient's AM-PAC score and their current ADL deficits, it is recommended that the patient have 3-5 sessions per week of Occupational Therapy at d/c to increase the patient's independence. Please see assessment section for further patient specific details. If patient discharges prior to next session this note will serve as a discharge summary. Please see below for the latest assessment towards goals. OT Equipment Recommendations  Equipment Needed: No  Other: defer to dc location    Assessment   Performance deficits / Impairments: Decreased functional mobility ; Decreased endurance;Decreased ADL status; Decreased balance;Decreased strength  Assessment: Prior to admission pt was living in a nursing facility. Pt unable to provide prior level information 2/2 cogntive deficits. Pt now likely below her basleine, requiring max A x 2 for stand pivot transfers and sit to stand transfers. Pt would benefit from continued OT while in acute care. Continue OT POC. Treatment Diagnosis: decreased ADLs and transfers secondary to L hip Fx  Prognosis: Fair  Decision Making: Medium Complexity  OT Education: OT Role;Plan of Care  Patient Education: verb understanding  REQUIRES OT FOLLOW UP: Yes  Activity Tolerance  Activity Tolerance: Patient Tolerated treatment well  Safety Devices  Safety Devices in place: Yes  Type of devices: Left in chair;Nurse notified;Call light within reach; Chair alarm in place           Patient Diagnosis(es): The encounter diagnosis was Closed fracture of neck of left femur, initial Maximum assistance;2 Person assistance  Sit to stand: Maximum assistance;2 Person assistance                       LUE AROM (degrees)  LUE General AROM: limited shoulder flexion to approximately 100  Left Hand AROM (degrees)  Left Hand AROM: WFL  RUE AROM (degrees)  RUE AROM : WFL  Right Hand AROM (degrees)  Right Hand AROM: WFL  LUE Strength  LUE Strength Comment: Grossly demonstrating 2/5  RUE Strength  RUE Strength Comment: grossly 3+/5                 Treatment included functional transfer training, ADLs, and patient education. Plan   Plan  Times per week: 5-7  Times per day: Daily  Current Treatment Recommendations: Strengthening, Balance Training, Functional Mobility Training, Endurance Training, Self-Care / ADL, Neuromuscular Re-education          Goals  Short term goals  Time Frame for Short term goals: by dc  Short term goal 1: Pt will complete BSC transfer wtih max A x 1  Short term goal 2: Pt will complete toileting with max A x 1  Short term goal 3: Pt will complete 2 seated grooming tasks with setup  Patient Goals   Patient goals : not stated       Therapy Time   Individual Concurrent Group Co-treatment   Time In 0820         Time Out 0859         Minutes 39         Timed Code Treatment Minutes: 24 Minutes(+15 min eval)    Margarita LAWRENCE  [address] [facility], OTR/L [id number]

## 2020-09-14 NOTE — PLAN OF CARE
Problem: Falls - Risk of:  Goal: Will remain free from falls  Description: Will remain free from falls  Outcome: Ongoing   Patient meets [facility] fall risk guidelines. Non skid footwear with ambulation. Bed in lowest position. Call light in reach. Bed alarm activated. Reminded to call for assistance before exiting bed. Continue safety precautions. Problem: Skin Integrity:  Goal: Will show no infection signs and symptoms  Description: Will show no infection signs and symptoms  Outcome: Ongoing  Patient POD# 0 of left hip arthroplasty. Left hip with dressing, CDI. Bruising noted to site. Pulses palpable. Afebrile. V.S.S. Continue IV antibiotics. Continue to reposition while in bed.

## 2020-09-14 NOTE — CARE COORDINATION
Case Management Assessment           Initial Evaluation                Date / Time of Evaluation: 9/14/2020 11:00 AM                 Assessment Completed by: Soniya Burton    Patient Name: Deirdre Timmons     YOB: 1935  Diagnosis: Other fracture of left femur, initial encounter for closed fracture ([facility] [address] Ashley Henson  Other fracture of left femur, initial encounter for closed fracture [facility]) Ashley Henson     Date / Time: 9/12/2020  7:23 PM    Patient Admission Status: Inpatient    If patient is discharged prior to next notation, then this note serves as note for discharge by case management. Current PCP: [address] Patient: No    Chart Reviewed: Yes  Patient/ Family Interviewed: Yes    Initial assessment completed at bedside with: son via phone, Edison  [phone]    Hospitalization in the last 30 days: No    Emergency Contacts:  Extended Emergency Contact Information  Primary Emergency Contact: Amishaesausantana Irving [address] Phone: [phone]  Mobile Phone: [phone]  Relation: Child  Secondary Emergency Contact: Anne Avaloscarmen [address] Phone: [phone]  Relation: Child    Advance Directives:   Code Status: DNR-CCA      Financial  Payor: MEDICARE / Plan: MEDICARE PART A AND B / Product Type: *No Product type* /     Pre-cert required for SNF: No    Pharmacy    [facility] [address], [address] [place] [phone]  [address] [address]  Phone: [phone] Fax: [phone]      Potential assistance Purchasing Medications:    Does Patient want to participate in local refill/ meds to beds program?:      Meds To Beds General Rules:  1. Can ONLY be done Monday- Friday between 8:30am-5pm  2. Prescription(s) must be in pharmacy by 3pm to be filled same day  3. Copy of patient's insurance/ prescription drug card and patient face sheet must be sent along with the prescription(s)  4.  Cost of Rx cannot be added to hospital bill. If financial assistance is needed, please contact unit  or ;  or  CANNOT provide pharmacy voucher for patients co-pays  5. Patients can then  the prescription on their way out of the hospital at discharge, or pharmacy can deliver to the bedside if staff is available. (payment due at time of pick-up or delivery - cash, check, or card accepted)     Able to afford home medications/ co-pay costs: Yes    [address]:      PT AM-PAC: 6 /24  OT AM-PAC: 9 /24    Mo Morganad: [facility]  Steps:      Plans to RETURN to current housing: Yes  Barriers to RETURNING to current housing: medical stability    Home Care Information  Currently ACTIVE with [address]: No  Home Care Agency: Not Applicable    Currently ACTIVE with [place] on Aging: No  Passport/ Waiver: No  Passport/ Waiver Services: Not Applicable          Durable Medical Equipment  DME Provider:    Equipment:      Home Oxygen and [address] [place] prior to admission: No  [address]: Not Applicable  Other Respiratory Equipment:          Dialysis  Active with HD/PD prior to admission: No  Nephrologist:      HD Center:  Not Applicable    DISCHARGE PLAN:  Disposition: Other: [facility] Phone: [phone]  Fax:      Transportation PLAN for discharge: EMS transportation     Factors facilitating achievement of predicted outcomes: Family support and Caregiver support    Barriers to discharge: Limited safety awareness and Limited insight into deficits    Additional Case Management Notes: Chetan spoke with Delores Jameson at [address] [address]. They are agreeable for pt to return to their facility at [facility]. They have full rehab therapy department. CHETAN spoke with son, Yadiel Carpio [phone]. He is agreeable for return to [facility]. Will continue to keep him informed of [facility] information.      The Plan for Transition of Care is related to the following treatment goals of Other fracture of left femur, initial encounter for closed fracture ([facility] [address] [S72.8X2A]  Other fracture of left femur, initial encounter for closed fracture ([facility] [address] [address] [S72.8X2A]    The Patient and/or patient representative ROBERT and her family were provided with a choice of provider and agrees with the discharge plan Not Indicated    Freedom of choice list was provided with basic dialogue that supports the patient's individualized plan of care/goals and shares the quality data associated with the providers.  Not Indicated    Care Transition patient: Diana Garcia RN  [facility]  Case Management Department  Ph: [phone]

## 2020-09-14 NOTE — PROGRESS NOTES
Pt having intermittent pain at left hip surgical site. Pt tolerated sitting up in chair for apx 6 hours. Pt moved back to bed with an assist x2 and gait belt. Pt given pain medication. Pt vital signs taken and non-life threatening. Pt is confused x4 which is baseline. Pt has no appetite at this time. This RN was able to assist pt in drinking apx 100 mls of water with the administration of her pain med.

## 2020-09-14 NOTE — PROGRESS NOTES
Physical Therapy    Facility/Department: [place] [address] [facility]  Initial Assessment    NAME: Neeru Mendez  : 1935  MRN: [id number]    Date of Service: 2020    Discharge Recommendations:ROBERT Winterdalila Carrillo scored a / on the AM-PAC short mobility form. Current research shows that an AM-PAC score of 17 or less is typically not associated with a discharge to the patient's home setting. Based on the patient's AM-PAC score and their current functional mobility deficits, it is recommended that the patient have 3-5 sessions per week of Physical Therapy at d/c to increase the patient's independence. Please see assessment section for further patient specific details. If patient discharges prior to next session this note will serve as a discharge summary. Please see below for the latest assessment towards goals. PT Equipment Recommendations  Equipment Needed: (defer for now)    Assessment   Assessment: 79 yo admitted for fall/L hip fx and now POD 1 from L hip hemiarthroplasty. Pt very confused this am and only able to state name and . It is unclear what her baeeline mobility and cogntive levels are (unable to reach staff at [facility]). Pt would benefit from continued skilled IP PT at discharge. Treatment Diagnosis: mobility impairment due to L Hip fx  Decision Making: Medium Complexity  REQUIRES PT FOLLOW UP: Yes  Activity Tolerance  Activity Tolerance: Patient limited by pain; Patient limited by cognitive status; Patient limited by endurance       Patient Diagnosis(es): The encounter diagnosis was Closed fracture of neck of left femur, initial encounter ([address] has a past medical history of Anemia, Dementia ([address] Hypertension, and Thyroid disease. has a past surgical history that includes Hysterectomy; Eye surgery; and hip surgery (Left, 2020).     Restrictions  Position Activity Restriction  Other position/activity restrictions: up with assist, WBAT L, anterior approach (per operative note)-RN to clarify with ortho MD     Vision/Hearing  Vision: Impaired  Vision Exceptions: Wears glasses at all times  Hearing: Exceptions to [facility]  Hearing Exceptions: Hard of hearing/hearing concerns       Subjective  General  Chart Reviewed: Yes  Patient assessed for rehabilitation services?: Yes  Additional Pertinent Hx: 81 yo admitted 20 for fall/L hip fx. OR  for L hip hemiarthroplasty per Dr. Georgiana Mills. Pmhx: dementia, HTN. Family / Caregiver Present: No  Diagnosis: L hip fx  Follows Commands: Impaired  Pain Screening  Patient Currently in Pain: Denies          Orientation  Orientation  Overall Orientation Status: (oriented to name/ only)     Social/Functional History  Social/Functional History  Type of Home: Facility([facility])  Additional Comments: Pt unable to provide any PLF due to her dementia. Attempted to call [phone] but could not reach staff. Objective          AROM RLE (degrees)  RLE AROM: WFL  PROM LLE (degrees)  LLE PROM: (hip/knee flexion to 90 degrees; ankle WFL)     Strength RLE  Strength RLE: (3+/5 grossly throughtout)  Strength LLE  Strength LLE: (hip flexion 1/5; knee ext 3/5; ankle WFL)        Bed mobility  Supine to Sit: 2 Person assistance(max assist x2 with max vc from raised ht bed; slow and effortful)  Scooting: Maximal assistance(to scoot EOB)     Transfers  Sit to Stand: (unable even with assist x2; attempted with rolling walker)  Bed to Chair: 2 Person Assistance(max assist x2 via sit pivot txfer; pt unable to WB much on L LE)        Balance  Sitting - Static: Fair;+  Sitting - Dynamic: Poor  Comments: Pt able to sit EOB x10 mins with CGA overall but several episodes LOB posterior requiring min assist to correct.         Plan   Plan  Times per week: 5-7  Current Treatment Recommendations: Transfer Training, Endurance Training, Strengthening, Balance Training  Safety Devices  Type of devices: Call light within reach, Chair alarm in place, Left in chair, Nurse notified(pt reclined)            AM-PAC Score  AM-PAC Inpatient Mobility Raw Score : 6 (09/14/20 1021)  AM-PAC Inpatient T-Scale Score : 23.55 (09/14/20 1021)  Mobility Inpatient CMS 0-100% Score: 100 (09/14/20 1021)  Mobility Inpatient CMS G-Code Modifier : CN (09/14/20 1021)         Goals  Short term goals  Time Frame for Short term goals: discharge  Short term goal 1: sit to/from supine supervision  Short term goal 2: bed to chair supervision WBAT L  Short term goal 3: min assist THR HEP x10  Patient Goals   Patient goals : unable to state       Therapy Time   Individual Concurrent Group Co-treatment   Time In 0820         Time Out 0859         Minutes 39           Timed Code Treatment Minutes: 29      Total Treatment Minutes: [facility],

## 2020-09-14 NOTE — PROGRESS NOTES
[facility] Orthopedic Surgery  Progress Note        POD # 1, s/p left hip hemiarthroplasty. Pt comfortable, no c/o, OOB to chair. Drsg left hip D/C/I,  Mild pain with left hip ROM, NVI    CBC:   Lab Results   Component Value Date    WBC 12.8 09/12/2020    RBC 4.34 09/12/2020    HGB 10.6 09/14/2020    HCT 32.6 09/14/2020    MCV 87.8 09/12/2020    MCH 28.6 09/12/2020    MCHC 32.6 09/12/2020    RDW 14.2 09/12/2020     09/12/2020    MPV 7.4 09/12/2020     PT/INR:    Lab Results   Component Value Date    PROTIME 11.9 09/12/2020    INR 1.03 09/12/2020     PTT:    Lab Results   Component Value Date    APTT 30.0 09/12/2020   [APTT    A/P: s/p left hip hemiarthroplasty. - Stable  - PT/OT, WBAT  - Ok to D/C to ECF from ortho standpoint.  - F/U Dr Hollis Da Silva in 2 weeks.       Jordan Escobedo MD 9/14/2020 1:36 PM

## 2020-09-15 VITALS
TEMPERATURE: 98.3 F | SYSTOLIC BLOOD PRESSURE: 123 MMHG | BODY MASS INDEX: 17.68 KG/M2 | DIASTOLIC BLOOD PRESSURE: 57 MMHG | HEIGHT: 66 IN | HEART RATE: 89 BPM | OXYGEN SATURATION: 95 % | RESPIRATION RATE: 15 BRPM | WEIGHT: 110 LBS

## 2020-09-15 LAB
ANION GAP SERPL CALCULATED.3IONS-SCNC: 9 MMOL/L (ref 3–16)
BASOPHILS ABSOLUTE: 0 K/UL (ref 0–0.2)
BASOPHILS RELATIVE PERCENT: 0.4 %
BUN BLDV-MCNC: 21 MG/DL (ref 7–20)
CALCIUM SERPL-MCNC: 8.3 MG/DL (ref 8.3–10.6)
CHLORIDE BLD-SCNC: 102 MMOL/L (ref 99–110)
CO2: 21 MMOL/L (ref 21–32)
CREAT SERPL-MCNC: 1.2 MG/DL (ref 0.6–1.2)
EOSINOPHILS ABSOLUTE: 0.1 K/UL (ref 0–0.6)
EOSINOPHILS RELATIVE PERCENT: 0.8 %
GFR AFRICAN AMERICAN: 52
GFR NON-AFRICAN AMERICAN: 43
GLUCOSE BLD-MCNC: 97 MG/DL (ref 70–99)
HCT VFR BLD CALC: 28.2 % (ref 36–48)
HEMOGLOBIN: 9.3 G/DL (ref 12–16)
LYMPHOCYTES ABSOLUTE: 1 K/UL (ref 1–5.1)
LYMPHOCYTES RELATIVE PERCENT: 8.8 %
MCH RBC QN AUTO: 28.9 PG (ref 26–34)
MCHC RBC AUTO-ENTMCNC: 33.1 G/DL (ref 31–36)
MCV RBC AUTO: 87.3 FL (ref 80–100)
MONOCYTES ABSOLUTE: 1 K/UL (ref 0–1.3)
MONOCYTES RELATIVE PERCENT: 8.9 %
NEUTROPHILS ABSOLUTE: 9.1 K/UL (ref 1.7–7.7)
NEUTROPHILS RELATIVE PERCENT: 81.1 %
PDW BLD-RTO: 14.4 % (ref 12.4–15.4)
PLATELET # BLD: 340 K/UL (ref 135–450)
PMV BLD AUTO: 7.8 FL (ref 5–10.5)
POTASSIUM REFLEX MAGNESIUM: 4 MMOL/L (ref 3.5–5.1)
RBC # BLD: 3.24 M/UL (ref 4–5.2)
SODIUM BLD-SCNC: 132 MMOL/L (ref 136–145)
WBC # BLD: 11.2 K/UL (ref 4–11)

## 2020-09-15 PROCEDURE — 6370000000 HC RX 637 (ALT 250 FOR IP): Performed by: ORTHOPAEDIC SURGERY

## 2020-09-15 PROCEDURE — 80048 BASIC METABOLIC PNL TOTAL CA: CPT

## 2020-09-15 PROCEDURE — 94150 VITAL CAPACITY TEST: CPT

## 2020-09-15 PROCEDURE — 85025 COMPLETE CBC W/AUTO DIFF WBC: CPT

## 2020-09-15 PROCEDURE — 94640 AIRWAY INHALATION TREATMENT: CPT

## 2020-09-15 PROCEDURE — 2580000003 HC RX 258: Performed by: ORTHOPAEDIC SURGERY

## 2020-09-15 RX ORDER — TRAMADOL HYDROCHLORIDE 50 MG/1
25 TABLET ORAL EVERY 6 HOURS PRN
Qty: 6 TABLET | Refills: 0 | Status: SHIPPED | OUTPATIENT
Start: 2020-09-15 | End: 2020-09-18

## 2020-09-15 RX ORDER — SENNA AND DOCUSATE SODIUM 50; 8.6 MG/1; MG/1
1 TABLET, FILM COATED ORAL 2 TIMES DAILY
Qty: 6 TABLET | Refills: 0 | Status: SHIPPED | OUTPATIENT
Start: 2020-09-15 | End: 2020-09-18

## 2020-09-15 RX ADMIN — ESCITALOPRAM 5 MG: 5 TABLET, FILM COATED ORAL at 08:18

## 2020-09-15 RX ADMIN — MEMANTINE HYDROCHLORIDE 10 MG: 5 TABLET ORAL at 08:20

## 2020-09-15 RX ADMIN — LEVOTHYROXINE SODIUM 75 MCG: 0.07 TABLET ORAL at 05:04

## 2020-09-15 RX ADMIN — ACETAMINOPHEN 650 MG: 650 SOLUTION ORAL at 11:21

## 2020-09-15 RX ADMIN — ACETAMINOPHEN 650 MG: 650 SOLUTION ORAL at 05:04

## 2020-09-15 RX ADMIN — Medication 10 ML: at 08:04

## 2020-09-15 ASSESSMENT — PAIN SCALES - PAIN ASSESSMENT IN ADVANCED DEMENTIA (PAINAD)
CONSOLABILITY: 0
NEGVOCALIZATION: 0
NEGVOCALIZATION: 0
TOTALSCORE: 0
NEGVOCALIZATION: 0
TOTALSCORE: 0
BODYLANGUAGE: 0
TOTALSCORE: 0
NEGVOCALIZATION: 0
BODYLANGUAGE: 0
CONSOLABILITY: 0
CONSOLABILITY: 0
FACIALEXPRESSION: 0
TOTALSCORE: 0
CONSOLABILITY: 0
BODYLANGUAGE: 0
BREATHING: 0
FACIALEXPRESSION: 0
BODYLANGUAGE: 0
BREATHING: 0
BREATHING: 0
BODYLANGUAGE: 0
FACIALEXPRESSION: 0
FACIALEXPRESSION: 0
CONSOLABILITY: 0
FACIALEXPRESSION: 0
FACIALEXPRESSION: 0
BODYLANGUAGE: 0
CONSOLABILITY: 0
TOTALSCORE: 0
BREATHING: 0
BODYLANGUAGE: 0
CONSOLABILITY: 0
FACIALEXPRESSION: 0
NEGVOCALIZATION: 0
NEGVOCALIZATION: 0
BREATHING: 0
NEGVOCALIZATION: 0
BREATHING: 0
BREATHING: 0

## 2020-09-15 ASSESSMENT — PAIN SCALES - GENERAL
PAINLEVEL_OUTOF10: 2
PAINLEVEL_OUTOF10: 0

## 2020-09-15 NOTE — PLAN OF CARE
Problem: Falls - Risk of:  Goal: Will remain free from falls  Description: Will remain free from falls  Outcome: Ongoing  Note: Pt remains a high fall risk. Pt not out of bed this shift. Bed alarm on. Bed in lowest position with wheels locked. Pt reminded to use call light if needs to get up. Call light within reach. Hourly rounding in place with reorientation as needed. Needs anticipated. Will continue to monitor. Problem: Skin Integrity:  Goal: Absence of new skin breakdown  Description: Absence of new skin breakdown  Outcome: Ongoing  Note: Pt turned q2h. Brief checked every two hours and changed as needed. Pt has a poor appetite this shift and is drinking little fluids. IVF running at 75mL/hr. Zinc paste applied as needed. Will continue to assess and monitor.

## 2020-09-15 NOTE — PROGRESS NOTES
Jeffry Hurley, pt's son, and provided updates on pt status and POC. Answered questions to the best of this RN's ability.     Electronically signed by Mile Dominguez RN on 2/95/4669 at 6:49 AM

## 2020-09-15 NOTE — PROGRESS NOTES
Pt had a 10 second run of SVT this morning in the 150s. Hospitalist notified with new order to draw BMP.     Electronically signed by Ritu Rothman RN on 4/51/1750 at 4:58 AM

## 2020-09-15 NOTE — PROGRESS NOTES
Hospitalist Progress Note      PCP: Emilio Salter    Date of Admission: 9/12/2020    Chief Complaint: Left femur fracture    Hospital Course: This is a 80-year-old female with a past medical history of Alzheimer dementia nursing home resident admitted after a fall left femur fracture status post repair. PT/OT pending. Subjective:     Feeling well, no new complaints since yesterday. Pain is controlled. No chest pain, sob, abdominal pain, n/v, or constipation. Medications:  Reviewed    Infusion Medications    sodium chloride 75 mL/hr at 09/14/20 2206     Scheduled Medications    sodium chloride flush  10 mL Intravenous 2 times per day    sennosides-docusate sodium  1 tablet Oral BID    enoxaparin  30 mg Subcutaneous Daily    donepezil  10 mg Oral Nightly    escitalopram  5 mg Oral Daily    levothyroxine  75 mcg Oral Daily    memantine  10 mg Oral BID    sodium chloride flush  10 mL Intravenous 2 times per day    acetaminophen  650 mg Oral Q6H     PRN Meds: sodium chloride flush, magnesium hydroxide, melatonin, sodium chloride flush, polyethylene glycol, promethazine **OR** ondansetron, traMADol, morphine      Intake/Output Summary (Last 24 hours) at 9/15/2020 0757  Last data filed at 9/15/2020 0631  Gross per 24 hour   Intake 2004 ml   Output 0 ml   Net 2004 ml       Physical Exam Performed:    BP (!) 136/59   Pulse 78   Temp 97.6 °F (36.4 °C) (Axillary)   Resp 18   Ht 5' 6\" (1.676 m)   Wt 110 lb (49.9 kg)   SpO2 93%   Breastfeeding No   BMI 17.75 kg/m²     General appearance: No apparent distress, appears stated age and cooperative. HEENT: Pupils equal, round, and reactive to light. Conjunctivae/corneas clear. Neck: Supple, with full range of motion. No jugular venous distention. Trachea midline. Respiratory:  Normal respiratory effort. Clear to auscultation, bilaterally without Rales/Wheezes/Rhonchi.   Cardiovascular: Regular rate and rhythm with normal S1/S2 without murmurs, rubs or gallops. Abdomen: Soft, non-tender, non-distended with normal bowel sounds. Musculoskeletal: No clubbing, cyanosis or edema bilaterally. Full range of motion without deformity. Skin: Skin color, texture, turgor normal.  No rashes or lesions. Neurologic:  Neurovascularly intact without any focal sensory/motor deficits. Cranial nerves: II-XII intact, grossly non-focal.  Psychiatric: Alert and oriented, thought content appropriate, normal insight  Capillary Refill: Brisk,< 3 seconds   Peripheral Pulses: +2 palpable, equal bilaterally       Labs:   Recent Labs     09/12/20 2037 09/14/20  0635 09/15/20  0518   WBC 12.8*  --  11.2*   HGB 12.4 10.6* 9.3*   HCT 38.2 32.6* 28.2*     --  340     Recent Labs     09/12/20  2037 09/15/20  0518    132*   K 4.0 4.0    102   CO2 24 21   BUN 15 21*   CREATININE 1.1 1.2   CALCIUM 8.8 8.3     No results for input(s): AST, ALT, BILIDIR, BILITOT, ALKPHOS in the last 72 hours. Recent Labs     09/12/20 2037   INR 1.03     No results for input(s): [place] in the last 72 hours. Urinalysis:      Lab Results   Component Value Date    NITRU POSITIVE 09/12/2020    WBCUA 0-2 09/12/2020    BACTERIA 4+ 09/12/2020    RBCUA 0-2 09/12/2020    BLOODU Negative 09/12/2020    SPECGRAV 1.020 09/12/2020    GLUCOSEU Negative 09/12/2020       Radiology:  XR HIP 2-3 VW W PELVIS LEFT   Final Result      Postoperative left hip arthroplasty      XR CHEST PORTABLE   Final Result      No acute cardiopulmonary findings. XR KNEE LEFT (1-2 VIEWS)   Final Result      No acute osseous findings. XR PELVIS (1-2 VIEWS)   Final Result      Left femoral neck displaced and varus angulated fracture. CT Head WO Contrast   Final Result      1. No acute intracranial hemorrhage or mass effect. 2.  Moderate atrophy. 3.  Mild chronic small vessel ischemia.             Assessment/Plan:    Active Hospital Problems    Diagnosis    Closed fracture of neck of left femur ([facility] [address] Eufemia Spencer Other fracture of left femur, initial encounter for closed fracture ([facility] [address] [S72.8X2A]     1. This is a 42-year-old female with a past medical history of Alzheimer dementia, hypothyroidism, depression nursing home resident admitted after a fall and left femur fracture status post repair, postop care per orthopedic.  -PT/OT assessments completed  -DVT ppx for discharge when appropriate  -seen by Ortho, ok to NAIDA, will discharge to facility today  2. Alzheimer dementia continue with home medication.   3.  Hypothyroidism on Synthroid    DVT Prophylaxis: Per orthopedic, she is started on Lovenox for DVT ppx  Diet: DIET GENERAL;  Dietary Nutrition Supplements: Standard High Calorie Oral Supplement  Code Status: DNR-CCA     PT/OT Eval Status: pending    Dispo - Return to facility with therapy today    Philip Durant DO

## 2020-09-15 NOTE — DISCHARGE SUMMARY
Hospital Medicine Discharge Summary    Patient: Adela Good     Gender: female  : 1935   Age: 80 y.o. MRN: [id number]    Admitting Physician: Elaine De La Garza MD  Discharge Physician: Jose A Madsen DO    Code Status: DNR-CCA     Admit Date: 2020   Discharge Date:  9/15/20     Disposition: SNF    Discharge Diagnoses: Active Hospital Problems    Diagnosis Date Noted    Closed fracture of neck of left femur ([address] [S72.002A]     Other fracture of left femur, initial encounter for closed fracture [facility]) [S72.8X2A] 2020       Outpatient to do list:     1) Follow-up appointments:    Primary care provider      Condition at Discharge: Stable    Hospital Course: This is a 70-year-old female with a past medical history of Alzheimer dementia nursing home resident admitted after a fall left femur fracture status post repair. DVT ppx on discharge. ROS negative however she has severe dementia at baseline. No distress. Additional findings or notes to primary provider:  None at this time    Discharge Medications:   Current Discharge Medication List      START taking these medications    Details   traMADol (ULTRAM) 50 MG tablet Take 0.5 tablets by mouth every 6 hours as needed for Pain for up to 3 days.   Qty: 6 tablet, Refills: 0    Comments: Reduce doses taken as pain becomes manageable  Associated Diagnoses: Closed fracture of neck of left femur, initial encounter ([facility])      enoxaparin (LOVENOX) 30 MG/0.3ML injection Inject 0.3 mLs into the skin daily  Qty: 9 mL, Refills: 0      sennosides-docusate sodium (SENOKOT-S) 8.6-50 MG tablet Take 1 tablet by mouth 2 times daily for 3 days  Qty: 6 tablet, Refills: 0           Current Discharge Medication List        Current Discharge Medication List      CONTINUE these medications which have NOT CHANGED    Details   vitamin D (VITAMIN D-1000 MAX ST) 25 MCG (1000 UT) TABS tablet Take 1,000 Units by mouth daily      loratadine (CLARITIN) 10 MG capsule Take 10 mg by mouth daily as needed (allergies)       bisacodyl (DULCOLAX) 10 MG suppository Place 10 mg rectally every 8 hours as needed for Constipation (if milk of magnesia is ineffective)       melatonin 3 MG TABS tablet Take 6 mg by mouth every evening       magnesium hydroxide (MILK OF MAGNESIA) 400 MG/5ML suspension Take 30 mLs by mouth 3 times daily as needed for Constipation      memantine ER (NAMENDA XR) 21 MG CP24 extended release capsule Take 21 mg by mouth daily       guaiFENesin-dextromethorphan (ROBITUSSIN DM) 100-10 MG/5ML syrup Take 10 mLs by mouth 4 times daily as needed for Cough      acetaminophen (TYLENOL) 325 MG tablet Take 650 mg by mouth 3 times daily as needed for Pain      levothyroxine (SYNTHROID) 75 MCG tablet Take 75 mcg by mouth daily       donepezil (ARICEPT) 10 MG tablet Take 10 mg by mouth nightly            Current Discharge Medication List      STOP taking these medications       escitalopram (LEXAPRO) 5 MG tablet Comments:   Reason for Stopping:         aspirin 325 MG EC tablet Comments:   Reason for Stopping:               Discharge ROS:  A complete review of systems was asked and negative. Discharge Exam:    BP (!) 136/59   Pulse 78   Temp 97.6 °F (36.4 °C) (Axillary)   Resp 18   Ht 5' 6\" (1.676 m)   Wt 110 lb (49.9 kg)   SpO2 93%   Breastfeeding No   BMI 17.75 kg/m²   General appearance:  NAD  HEENT:   Normal cephalic, atraumatic, moist mucous membranes, no oropharyngeal erythema or exudate  Neck: Supple, trachea midline, no anterior cervical or SC LAD  Heart[de-identified] Normal s1/s2, RRR, no murmurs, gallops, or rubs. No leg edema  Lungs:  Clear to auscultation bilaterally, no wheeze, rales or rhonchi, no use of accessory musclesNormal respiratory effort. Clear to auscultation, bilaterally without Rales/Wheezes/Rhonchi.   Abdomen: Soft, non-tender, non-distended, bowel sounds present, no masses  Musculoskeletal:  No clubbing, no cyanosis, or edema  Skin: No lesion or masses  Neurologic:  Neurovascularly intact without any focal sensory/motor deficits. Cranial nerves: II-XII intact, grossly non-focal.  Psychiatric:  Alert, no agitation    Labs: For convenience and continuity at follow-up the following most recent labs are provided:    Lab Results   Component Value Date    WBC 11.2 09/15/2020    HGB 9.3 09/15/2020    HCT 28.2 09/15/2020    MCV 87.3 09/15/2020     09/15/2020     09/15/2020    K 4.0 09/15/2020     09/15/2020    CO2 21 09/15/2020    BUN 21 09/15/2020    CREATININE 1.2 09/15/2020    CALCIUM 8.3 09/15/2020    ALKPHOS 73 12/28/2012     12/28/2012     12/28/2012    BILITOT 0.60 12/28/2012    LABALBU 4.1 12/28/2012     Lab Results   Component Value Date    INR 1.03 09/12/2020    INR 0.92 12/28/2012       Radiology:  Xr Pelvis (1-2 Views)    Result Date: 9/12/2020  XR PELVIS (1-2 VIEWS) Indication: Left femur fracture Comparison: None. Findings: Left femoral neck fracture, displaced with varus angulation deformity. No other fractures identified. No dislocation. Large stool burden in the rectum. Left femoral neck displaced and varus angulated fracture. Xr Knee Left (1-2 Views)    Result Date: 9/12/2020  XR KNEE LEFT (1-2 VIEWS) Indication: Left knee pain. Comparison: None. Findings: No acute fracture or dislocation. Severe degenerative narrowing of medial femoral tibial compartment. No significant joint effusion. No acute osseous findings. Ct Head Wo Contrast    Result Date: 9/12/2020  CT HEAD WO CONTRAST Indication: Fall. Head injury. Comparison: None. Technique:  CT head was performed without intravenous contrast. Coronal and sagittal reformations were created. Up-to-date CT equipment and radiation dose reduction techniques were employed. Findings: No evidence of acute intracranial hemorrhage or extra-axial fluid collection. No mass effect or midline shift. Moderate diffuse atrophy with enlarged ventricles and sulci.  Basal

## 2020-09-15 NOTE — CARE COORDINATION
Patient transport today back to [address] [place] [address] by [address] [facility] at 12:00 pm noon. Patient will get rehab at facility. Nurse report [phone]  Fax [phone]  Faxed discharge packet.   Electronically signed by Faustino Guaman RN on 9/15/2020 at 10:24 AM

## 2020-09-15 NOTE — DISCHARGE INSTR - COC
Continuity of Care Form    Patient Name: Yovana Toledo   :  1935  MRN:  [id number]    Admit date:  2020  Discharge date:  ***    Code Status Order: DNR-CCA   Advance Directives:   [facility] Documentation       Date/Time Healthcare Directive Type of Healthcare Directive Copy in [address] Agent's Name Healthcare Agent's Phone Number    [phone]  Yes, patient has an advance directive for healthcare treatment  Durable power of  for health care; Health care treatment directive  Yes, copy in chart  [facility]  In Chart            Admitting Physician:  [facility], MD  PCP: Despina Denver    Discharging Nurse: [facility] Unit/Room#: 4021/1950-27  Discharging Unit Phone Number: ***    Emergency Contact:   Extended Emergency Contact Information  Primary Emergency Contact: Antonio Sanford [address] Phone: [phone]  Mobile Phone: [phone]  Relation: Child  Secondary Emergency Contact: Nancy Payne [address] Phone: [phone]  Relation: Child    Past Surgical History:  Past Surgical History:   Procedure Laterality Date    EYE SURGERY      HIP SURGERY Left 2020    LEFT HIP HEMIARTHROPLASTY performed by Cindy Rosa MD at [place] [address]         Immunization History:   Immunization History   Administered Date(s) Administered    Tdap (Boostrix, Adacel) 2018       Active Problems:  Patient Active Problem List   Diagnosis Code    Closed fracture of part of upper end of humerus S42.209A    Closed nondisplaced fracture of proximal phalanx of left little finger S62.647A    Other fracture of left femur, initial encounter for closed fracture [facility]) S72.8X2A    Closed fracture of neck of left femur ([facility] [address] S72.002A       Isolation/Infection:   Isolation            No Isolation          Unreconciled External Infections       Infection Onset Last Indicated Last Received Source    No mapped external infections found      . Unmapped Infections (1)        Rule Out COVID-19 09/13/20 09/12/20 09/13/20                   Patient Infection Status       Infection Onset Added Last Indicated Last Indicated By Review Planned Expiration Resolved Resolved By    None active    Resolved    COVID-19 Rule Out 09/12/20 09/12/20 09/12/20 COVID-19 (Ordered)   09/12/20 Rule-Out Test Resulted            Nurse Assessment:  Last Vital Signs: BP (!) 136/59   Pulse 78   Temp 97.6 °F (36.4 °C) (Axillary)   Resp 18   Ht 5' 6\" (1.676 m)   Wt 110 lb (49.9 kg)   SpO2 93%   Breastfeeding No   BMI 17.75 kg/m²     Last documented pain score (0-10 scale): Pain Level: 2  Last Weight:   Wt Readings from Last 1 Encounters:   09/12/20 110 lb (49.9 kg)     Mental Status:  Oriented to person only    IV Access:  - None    Nursing Mobility/ADLs:  Walking   Dependent  Transfer  Dependent  Bathing  Dependent  Dressing  Dependent  Toileting  Dependent  Feeding  Dependent  Med Admin  Dependent  Med Delivery   whole    Wound Care Documentation and Therapy:        Elimination:  Continence:   · Bowel: No  · Bladder: No  Urinary Catheter: None   Colostomy/Ileostomy/Ileal Conduit: No       Date of Last BM: 09/15/2020    Intake/Output Summary (Last 24 hours) at 9/15/2020 0755  Last data filed at 9/15/2020 0631  Gross per 24 hour   Intake 2004 ml   Output 0 ml   Net 2004 ml     I/O last 3 completed shifts: In: 2348 [P.O.:400; I.V.:1948]  Out: 0     Safety Concerns:     History of Falls (last 30 days)    Impairments/Disabilities:      None    Nutrition Therapy:  Current Nutrition Therapy:   - Oral Diet:  General    Routes of Feeding: Oral  Liquids: Thin Liquids  Daily Fluid Restriction: no  Last Modified Barium Swallow with Video (Video Swallowing Test): not done    Treatments at the Time of Hospital Discharge:   Respiratory Treatments: ***  Oxygen Therapy:  is not on home oxygen therapy.   Ventilator:    - No ventilator support    Rehab Therapies: Physical Therapy and Occupational Therapy  Weight Bearing Status/Restrictions: No weight bearing restirctions- weight bearing as tolerated  Other Medical Equipment (for information only, NOT a DME order):  {EQUIPMENT:[id number]}  Other Treatments: ***    Patient's personal belongings (please select all that are sent with patient):  None    RN SIGNATURE:  Electronically signed by Tello Peña RN on 9/15/20 at 9:33 AM EDT    CASE MANAGEMENT/SOCIAL WORK SECTION    Inpatient Status Date: ***    Readmission Risk Assessment Score:  Readmission Risk              Risk of Unplanned Readmission:        11           Discharging to Facility/ [facility]  [address] , [address] [place] [address]  Phone: [phone]  Fax: [phone]        / signature: Electronically signed by Evon Asencio RN on 9/15/20 at 10:27 AM EDT    PHYSICIAN SECTION    Prognosis: Good    Condition at Discharge: Stable    Rehab Potential (if transferring to Rehab): Fair    Recommended Labs or Other Treatments After Discharge:   PT/OT, WBAT  - Ok to D/C to ECF from ortho standpoint.  - F/U Dr Hillary Smith in 2 weeks  - DVT prophylaxis for one month with Lovenox subq 30 mg once daily    Physician Certification: I certify the above information and transfer of Bárbara Stone  is necessary for the continuing treatment of the diagnosis listed and that she requires [facility] for greater 30 days.      Update Admission H&P: No change in H&P    PHYSICIAN SIGNATURE:  Electronically signed by Mynor An DO on 9/15/20 at 7:56 AM EDT

## 2020-09-15 NOTE — CARE COORDINATION
Case Management Assessment            Discharge Note                    Date / Time of Note: 9/15/2020 10:27 AM                  Discharge Note Completed by: Chandan Alicea    Patient Name: Dax Eric   YOB: 1935  Diagnosis: Other fracture of left femur, initial encounter for closed fracture ([facility] [address] Alonzo Smith  Other fracture of left femur, initial encounter for closed fracture [facility]) Alonzo Smith   Date / Time: 9/12/2020  7:23 PM    Current PCP: [address] [place] patient: No    Hospitalization in the last 30 days: No    Advance Directives:  Code Status: DNR-CCA  [facility] DNR form completed and on chart: Yes    Financial:  Payor: MEDICARE / Plan: MEDICARE PART A AND B / Product Type: *No Product type* /      Pharmacy:    Surinder Ravi [address] - F [phone]  [address] [address]  Phone: [phone] Fax: [phone]      Assistance purchasing medications?:    Assistance provided by Case Management: None at this time    Does patient want to participate in local refill/ meds to beds program?:      Meds To Beds General Rules:  1. Can ONLY be done Monday- Friday between 8:30am-5pm  2. Prescription(s) must be in pharmacy by 3pm to be filled same day  3. Copy of patient's insurance/ prescription drug card and patient face sheet must be sent along with the prescription(s)  4. Cost of Rx cannot be added to hospital bill. If financial assistance is needed, please contact unit  or ;  or  CANNOT provide pharmacy voucher for patients co-pays  5.  Patients can then  the prescription on their way out of the hospital at discharge, or pharmacy can deliver to the bedside if staff is available. (payment due at time of pick-up or delivery - cash, check, or card accepted)     Able to afford home medications/ co-pay costs: snf    ADLS:  Current PT AM-PAC Score: 6 /24  Current OT AM-PAC Score: 9 /24      DISCHARGE Disposition: [facility] (SNF): Najma Deluna Phone: [phone] Fax: [phone]    LOC at discharge: Skilled  [address] Completed: Yes    Notification completed in HENS/PAS?:  Not Applicable    IMM Completed:   Not Indicated    Transportation:  Transportation PLAN for discharge: EMS transportation   Mode of Transport: Ambulance stretcher - BLS  Reason for medical transport: Orthopedic device Femur Fracture requires special handling during transport  Name of Transport Company: [employer]  Phone: [phone]  Time of Transport: 12:00 pm noon      Transport form completed: Yes/ faxed to [address] [place] [address] ordered at discharge: No    Referrals made at [facility] for outpatient continued care:  Not Applicable    Additional CM Notes: Patient is a resident at [facility] and will return with rehab for Femur FX to left leg. Transport by ambulance at 12pm noon today. Family informed. The Plan for Transition of Care is related to the following treatment goals of Other fracture of left femur, initial encounter for closed fracture ([facility] [address] [S72.8X2A]  Other fracture of left femur, initial encounter for closed fracture [facility]) [S72.8X2A]    The Patient and/or patient representative ROBERT and her family were provided with a choice of provider and agrees with the discharge plan Yes    Freedom of choice list was provided with basic dialogue that supports the patient's individualized plan of care/goals and shares the quality data associated with the providers.  Yes    Care Transitions patient: No    James De Santiago RN  [facility] [employer]  Case Management Department  Ph: [phone]

## 2020-09-15 NOTE — PROGRESS NOTES
AM assessment charted. BP (!) 119/55   Pulse 82   Temp 98 °F (36.7 °C) (Oral)   Resp 16   Ht 5' 6\" (1.676 m)   Wt 110 lb (49.9 kg)   SpO2 95%   Breastfeeding No   BMI 17.75 kg/m²   Pt denies any pain,scored 0 on advanced dementia scale. Appears to only be in pain with movement. No SOB. Will d/c today. Will continue to monitor.

## 2020-09-21 ENCOUNTER — CARE COORDINATION (OUTPATIENT)
Dept: CASE MANAGEMENT | Age: 85
End: 2020-09-21

## 2020-09-21 NOTE — CARE COORDINATION
[facility] acute hospital for patient updates. Left message with Kimberley for the nurse to call back with updates. Returned call. Spoke with :     Nurse                                          @ :[facility]    Incision status:States dressing dry and intact with no drainage. Pain control:States taking 1/2 Ultram prior to therapy. Therapies involved:States needs a lot of encouragement, but participating in therapy well. Tolerating:Well. Barriers to being discharged home:Na, resident at [address]. Projected discharge date:NA, resident. Discharge needs:Na, resident.     Aravind PEDERSENN  Care Transition Nurse for ortho bundle  [phone]

## 2020-10-01 ENCOUNTER — CARE COORDINATION (OUTPATIENT)
Dept: CASE MANAGEMENT | Age: 85
End: 2020-10-01

## 2020-10-01 NOTE — CARE COORDINATION
Called Kimberley breaux [place] for patient updates. Left message for Chepe Vargas the nurse to return call.   Pierce De La Cruz BSN  Care Transition Nurse for ortho bundle  [phone]

## 2020-10-13 ENCOUNTER — CARE COORDINATION (OUTPATIENT)
Dept: CASE MANAGEMENT | Age: 85
End: 2020-10-13

## 2020-10-13 NOTE — CARE COORDINATION
Spoke with : Jimmie                                              @ :[facility]    Incision status:States no skin issues. Pain control:States no pain issues. Therapies involved:States therapy was put on hold at the facility and restarted today.     Tolerating:Well    Barriers to being discharged home:NA, LTC    Projected discharge date:NA, LTC    Discharge needs:NA, [facility] Transition Nurse for ortho bundle  [phone]

## 2020-10-21 ENCOUNTER — CARE COORDINATION (OUTPATIENT)
Dept: CASE MANAGEMENT | Age: 85
End: 2020-10-21

## 2020-10-21 NOTE — CARE COORDINATION
Spoke with :   Chepe Vargas                                            @ :[facility]    Incision status:States free from redness or drainage. Pain control:States pain is well controlled. Therapies involved:Continues with therapy. Tolerating:well    Barriers to being discharged home:NA, LTC    Projected discharge date:NA, LTC    Discharge needs:NA, LTC    States going to contact physician office for follow up visit or xrays.   Pierce ALCALA  Care Transition Nurse for ortho bundle  [phone]

## 2020-10-27 ENCOUNTER — CARE COORDINATION (OUTPATIENT)
Dept: CASE MANAGEMENT | Age: 85
End: 2020-10-27

## 2020-10-27 NOTE — CARE COORDINATION
Called patient for updates. Left message for nurse to return call.   Inis Koyanagi BSN  Care Transition Nurse for ortho bundle  [phone]

## 2020-11-03 ENCOUNTER — CARE COORDINATION (OUTPATIENT)
Dept: CASE MANAGEMENT | Age: 85
End: 2020-11-03

## 2020-11-03 NOTE — CARE COORDINATION
Spoke with :    Lon                                           @ :[facility]    Incision status:States incision is healed. Pain control:States pain is well controlled. Therapies involved:Continues with therapy.     Tolerating:Well    Barriers to being discharged home:Na, LTC    Projected discharge date:NA, LTC    Discharge needs:KRISHAN, [facility] Transition Nurse for ortho bundle  [phone]

## 2020-11-12 ENCOUNTER — CARE COORDINATION (OUTPATIENT)
Dept: CASE MANAGEMENT | Age: 85
End: 2020-11-12

## 2020-11-12 NOTE — CARE COORDINATION
Spoke with :      Jack Patel                                         @ :[facility] patient is being evaluated for Hospice today. No complications with incision.     Johnathon Loaiza BSN  Care Transition Nurse for [facility]  [phone]

## 2020-11-18 ENCOUNTER — CARE COORDINATION (OUTPATIENT)
Dept: CASE MANAGEMENT | Age: 85
End: 2020-11-18

## 2020-11-18 NOTE — CARE COORDINATION
Spoke with [address] @ Abbe Madsen. States that patient was picked up and went to Hospice and passed away this past week.   Tg Held BSN  Care Transition Nurse for ortho bundle  [phone]

## (undated) DEVICE — APPLICATOR MEDICATED 26 CC SOLUTION HI LT ORNG CHLORAPREP

## (undated) DEVICE — YANKAUER,OPEN TIP,W/O VENT,STERILE: Brand: MEDLINE INDUSTRIES, INC.

## (undated) DEVICE — COVER,TABLE,HEAVY DUTY,77"X90",STRL: Brand: MEDLINE

## (undated) DEVICE — Z DISCONTINUED USE 2716304 SUTURE STRATAFIX SPRL SZ 3-0 L12IN ABSRB UD FS-1 L24MM 3/8

## (undated) DEVICE — SUTURE VCRL SZ 1 L18IN ABSRB UD L36MM CT-1 1/2 CIR J841D

## (undated) DEVICE — SUTURE VCRL SZ 2-0 L18IN ABSRB UD CT-1 L36MM 1/2 CIR J839D

## (undated) DEVICE — SUTURE ETHBND EXCEL SZ 2 L30IN NONABSORBABLE GRN L40MM V-37 MX69G

## (undated) DEVICE — SUTURE VCRL SZ 0 L18IN ABSRB UD L36MM CT-1 1/2 CIR J840D

## (undated) DEVICE — SOLUTION IV 1000ML 0.9% SOD CHL

## (undated) DEVICE — Z DISCONTINUED USE 2275686 GLOVE SURG SZ 8 L12IN FNGR THK13MIL WHT ISOLEX POLYISOPRENE

## (undated) DEVICE — DUAL CUT SAGITTAL BLADE

## (undated) DEVICE — ELECTROSURGICAL PENCIL ROCKER SWITCH NON COATED BLADE ELECTRODE 10 FT (3 M) CORD: Brand: MEGADYNE

## (undated) DEVICE — SUTURE STRATAFIX SPRL SZ 1 L14IN ABSRB VLT L48CM CTX 1/2 SXPD2B405

## (undated) DEVICE — E-Z CLEAN, NON-STICK, PTFE COATED, ELECTROSURGICAL BLADE ELECTRODE, MODIFIED EXTENDED INSULATION, 2.5 INCH (6.35 CM): Brand: MEGADYNE

## (undated) DEVICE — COAXIAL HIGH FLOW TIP WITH SOFT SHIELD

## (undated) DEVICE — BANDAGE COMPR W3INXL15FT BGE E SGL LAYERED CLP CLSR

## (undated) DEVICE — GOWN,SIRUS,POLYRNF,BRTHSLV,XL,30/CS: Brand: MEDLINE

## (undated) DEVICE — Z INACTIVE NO SUPPLIER SOLUTIONIRRIG 3000ML 0.9% SOD CHL FLX CONT [79720808] [HOSPIRA WORLDWIDE INC]

## (undated) DEVICE — COVER LT HNDL BLU PLAS

## (undated) DEVICE — PROTECTOR ULN NRV PUR FOAM HK LOOP STRP ANATOMICALLY

## (undated) DEVICE — SURE SET-DOUBLE BASIN-LF: Brand: MEDLINE INDUSTRIES, INC.

## (undated) DEVICE — COVER,MAYO STAND,XL,STERILE: Brand: MEDLINE

## (undated) DEVICE — SHEET,DRAPE,53X77,STERILE: Brand: MEDLINE

## (undated) DEVICE — STRIP,CLOSURE,WOUND,MEDI-STRIP,1/2X4: Brand: MEDLINE

## (undated) DEVICE — SUTURE MCRYL SZ 4-0 L27IN ABSRB UD L19MM PS-2 1/2 CIR PRIM Y426H

## (undated) DEVICE — ELECTROSURGICAL PENCIL ROCKER SWITCH NON COATED BLADE ELECTRODE 10 FT (3 M) CORD HOLSTER: Brand: MEGADYNE

## (undated) DEVICE — SUTURE VCRL UD BR CT 3-0 18IN CT1 J838D

## (undated) DEVICE — PACK PROCEDURE SURG TOT HIP

## (undated) DEVICE — HANDPIECE SUCTION TUBING INTERPULSE 10FT

## (undated) DEVICE — JEWISH HOSPITAL TURNOVER KIT: Brand: MEDLINE INDUSTRIES, INC.

## (undated) DEVICE — BLANKET WRM W29.9XL79.1IN UP BODY FORC AIR MISTRAL-AIR

## (undated) DEVICE — GOWN,SIRUS,POLYRNF,BRTHSLV,LG,30/CS: Brand: MEDLINE

## (undated) DEVICE — DRAPE,HIP,W/POUCHES,STERILE: Brand: MEDLINE

## (undated) DEVICE — BLADE ES ELASTOMERIC COAT INSUL DURABLE BEND UPTO 90DEG

## (undated) DEVICE — SURGICAL SET UP - SURE SET: Brand: MEDLINE INDUSTRIES, INC.

## (undated) DEVICE — DRESSING FOAM SELF ADH 20X10 CM ABSORBENT MEPILEX BORDER

## (undated) DEVICE — PLATE ES AD W 9FT CRD 2